# Patient Record
Sex: MALE | Race: WHITE | NOT HISPANIC OR LATINO | URBAN - METROPOLITAN AREA
[De-identification: names, ages, dates, MRNs, and addresses within clinical notes are randomized per-mention and may not be internally consistent; named-entity substitution may affect disease eponyms.]

---

## 2018-11-06 ENCOUNTER — INPATIENT (INPATIENT)
Facility: HOSPITAL | Age: 83
LOS: 8 days | Discharge: NURSING, REHAB OR LONG-TERM CARE | End: 2018-11-15
Attending: FAMILY MEDICINE | Admitting: FAMILY MEDICINE
Payer: MEDICARE

## 2018-11-06 VITALS
DIASTOLIC BLOOD PRESSURE: 61 MMHG | HEIGHT: 70 IN | SYSTOLIC BLOOD PRESSURE: 101 MMHG | RESPIRATION RATE: 17 BRPM | WEIGHT: 179.9 LBS | TEMPERATURE: 98 F | OXYGEN SATURATION: 98 % | HEART RATE: 76 BPM

## 2018-11-06 LAB
ALBUMIN SERPL ELPH-MCNC: 3.2 G/DL — LOW (ref 3.3–5)
ALP SERPL-CCNC: 68 U/L — SIGNIFICANT CHANGE UP (ref 40–120)
ALT FLD-CCNC: 19 U/L — SIGNIFICANT CHANGE UP (ref 12–78)
ANION GAP SERPL CALC-SCNC: 4 MMOL/L — LOW (ref 5–17)
APTT BLD: 28.4 SEC — SIGNIFICANT CHANGE UP (ref 27.5–36.3)
AST SERPL-CCNC: 14 U/L — LOW (ref 15–37)
BASOPHILS # BLD AUTO: 0.03 K/UL — SIGNIFICANT CHANGE UP (ref 0–0.2)
BASOPHILS NFR BLD AUTO: 0.3 % — SIGNIFICANT CHANGE UP (ref 0–2)
BILIRUB SERPL-MCNC: 0.2 MG/DL — SIGNIFICANT CHANGE UP (ref 0.2–1.2)
BLD GP AB SCN SERPL QL: SIGNIFICANT CHANGE UP
BUN SERPL-MCNC: 40 MG/DL — HIGH (ref 7–23)
CALCIUM SERPL-MCNC: 8.5 MG/DL — SIGNIFICANT CHANGE UP (ref 8.5–10.1)
CHLORIDE SERPL-SCNC: 109 MMOL/L — HIGH (ref 96–108)
CO2 SERPL-SCNC: 28 MMOL/L — SIGNIFICANT CHANGE UP (ref 22–31)
CREAT SERPL-MCNC: 1.02 MG/DL — SIGNIFICANT CHANGE UP (ref 0.5–1.3)
EOSINOPHIL # BLD AUTO: 0.16 K/UL — SIGNIFICANT CHANGE UP (ref 0–0.5)
EOSINOPHIL NFR BLD AUTO: 1.8 % — SIGNIFICANT CHANGE UP (ref 0–6)
GLUCOSE SERPL-MCNC: 110 MG/DL — HIGH (ref 70–99)
HCT VFR BLD CALC: 38.1 % — LOW (ref 39–50)
HGB BLD-MCNC: 12.4 G/DL — LOW (ref 13–17)
IMM GRANULOCYTES NFR BLD AUTO: 0.5 % — SIGNIFICANT CHANGE UP (ref 0–1.5)
INR BLD: 0.9 RATIO — SIGNIFICANT CHANGE UP (ref 0.88–1.16)
LYMPHOCYTES # BLD AUTO: 1.12 K/UL — SIGNIFICANT CHANGE UP (ref 1–3.3)
LYMPHOCYTES # BLD AUTO: 12.9 % — LOW (ref 13–44)
MCHC RBC-ENTMCNC: 29.5 PG — SIGNIFICANT CHANGE UP (ref 27–34)
MCHC RBC-ENTMCNC: 32.5 GM/DL — SIGNIFICANT CHANGE UP (ref 32–36)
MCV RBC AUTO: 90.7 FL — SIGNIFICANT CHANGE UP (ref 80–100)
MONOCYTES # BLD AUTO: 0.52 K/UL — SIGNIFICANT CHANGE UP (ref 0–0.9)
MONOCYTES NFR BLD AUTO: 6 % — SIGNIFICANT CHANGE UP (ref 2–14)
NEUTROPHILS # BLD AUTO: 6.81 K/UL — SIGNIFICANT CHANGE UP (ref 1.8–7.4)
NEUTROPHILS NFR BLD AUTO: 78.5 % — HIGH (ref 43–77)
NRBC # BLD: 0 /100 WBCS — SIGNIFICANT CHANGE UP (ref 0–0)
PLATELET # BLD AUTO: 254 K/UL — SIGNIFICANT CHANGE UP (ref 150–400)
POTASSIUM SERPL-MCNC: 4.5 MMOL/L — SIGNIFICANT CHANGE UP (ref 3.5–5.3)
POTASSIUM SERPL-SCNC: 4.5 MMOL/L — SIGNIFICANT CHANGE UP (ref 3.5–5.3)
PROT SERPL-MCNC: 6.4 GM/DL — SIGNIFICANT CHANGE UP (ref 6–8.3)
PROTHROM AB SERPL-ACNC: 10 SEC — SIGNIFICANT CHANGE UP (ref 10–12.9)
RBC # BLD: 4.2 M/UL — SIGNIFICANT CHANGE UP (ref 4.2–5.8)
RBC # FLD: 14.4 % — SIGNIFICANT CHANGE UP (ref 10.3–14.5)
SODIUM SERPL-SCNC: 141 MMOL/L — SIGNIFICANT CHANGE UP (ref 135–145)
TROPONIN I SERPL-MCNC: 0.39 NG/ML — HIGH (ref 0.01–0.04)
TYPE + AB SCN PNL BLD: SIGNIFICANT CHANGE UP
WBC # BLD: 8.68 K/UL — SIGNIFICANT CHANGE UP (ref 3.8–10.5)
WBC # FLD AUTO: 8.68 K/UL — SIGNIFICANT CHANGE UP (ref 3.8–10.5)

## 2018-11-06 PROCEDURE — 72125 CT NECK SPINE W/O DYE: CPT | Mod: 26

## 2018-11-06 PROCEDURE — 93010 ELECTROCARDIOGRAM REPORT: CPT

## 2018-11-06 PROCEDURE — 71045 X-RAY EXAM CHEST 1 VIEW: CPT | Mod: 26

## 2018-11-06 PROCEDURE — 70450 CT HEAD/BRAIN W/O DYE: CPT | Mod: 26

## 2018-11-06 RX ORDER — TETANUS TOXOID, REDUCED DIPHTHERIA TOXOID AND ACELLULAR PERTUSSIS VACCINE, ADSORBED 5; 2.5; 8; 8; 2.5 [IU]/.5ML; [IU]/.5ML; UG/.5ML; UG/.5ML; UG/.5ML
0.5 SUSPENSION INTRAMUSCULAR ONCE
Qty: 0 | Refills: 0 | Status: COMPLETED | OUTPATIENT
Start: 2018-11-06 | End: 2018-11-06

## 2018-11-06 NOTE — ED PROVIDER NOTE - MEDICAL DECISION MAKING DETAILS
Pt with unwitnessed fall at assisted living with laceration to left hand. Plan: CT head, laceration repair, labs.

## 2018-11-06 NOTE — ED PROVIDER NOTE - OBJECTIVE STATEMENT
89 y/o male with a PMHx of HTN, BPH presents to the ED from [facility] s/p unwitnessed fall. Not on anticoagulants. Pt does not know why he is at the hospital. +laceration. Denies any pain. NKDA. 89 y/o male with a PMHx of HTN, BPH presents to the ED from [facility] s/p unwitnessed fall. Not on anticoagulants. Pt does not know why he is at the hospital. +laceration. Denies any pain. NKDA. Nonsmoker.

## 2018-11-06 NOTE — ED ADULT TRIAGE NOTE - CHIEF COMPLAINT QUOTE
patient presents in ED from [facility] assisted living s/p unwitnessed fall. Cervical Collar in place. laceration to left hand. unknown head injury. patient is not on blood thinners.

## 2018-11-06 NOTE — ED PROVIDER NOTE - ENMT, MLM
Airway patent, Nasal mucosa clear. Mouth with normal mucosa. Throat has no vesicles, no oropharyngeal exudates and uvula is midline. Airway patent, Nasal mucosa clear. Mouth with normal mucosa. Throat has no vesicles, no oropharyngeal exudates and uvula is midline. Pt wearing collar.

## 2018-11-06 NOTE — ED ADULT NURSE NOTE - OBJECTIVE STATEMENT
pt was found sitting on the bed with bloody hand as per EMS. Pt unable to recall details of fall, unknown if patient fell or not. Multiple skin tears to bl/le and LUE. Pt's nail to fourth finger on left hand is nearly removed, blood under middle left finger. Pt is very hard of hearing

## 2018-11-06 NOTE — ED ADULT NURSE NOTE - NSIMPLEMENTINTERV_GEN_ALL_ED
Implemented All Fall with Harm Risk Interventions:  [place] to call system. Call bell, personal items and telephone within reach. Instruct patient to call for assistance. Room bathroom lighting operational. Non-slip footwear when patient is off stretcher. Physically safe environment: no spills, clutter or unnecessary equipment. Stretcher in lowest position, wheels locked, appropriate side rails in place. Provide visual cue, wrist band, yellow gown, etc. Monitor gait and stability. Monitor for mental status changes and reorient to person, place, and time. Review medications for side effects contributing to fall risk. Reinforce activity limits and safety measures with patient and family. Provide visual clues: red socks.

## 2018-11-06 NOTE — ED PROVIDER NOTE - SKIN, MLM
Skin normal color for race, warm, dry and intact. No evidence of rash. 3cm laceration left fourth finger medially distal. Fourth finger partial nail avulsion of medial nail.

## 2018-11-07 DIAGNOSIS — R55 SYNCOPE AND COLLAPSE: ICD-10-CM

## 2018-11-07 DIAGNOSIS — I35.0 NONRHEUMATIC AORTIC (VALVE) STENOSIS: ICD-10-CM

## 2018-11-07 DIAGNOSIS — I10 ESSENTIAL (PRIMARY) HYPERTENSION: ICD-10-CM

## 2018-11-07 DIAGNOSIS — I21.4 NON-ST ELEVATION (NSTEMI) MYOCARDIAL INFARCTION: ICD-10-CM

## 2018-11-07 LAB
ANION GAP SERPL CALC-SCNC: 8 MMOL/L — SIGNIFICANT CHANGE UP (ref 5–17)
BUN SERPL-MCNC: 41 MG/DL — HIGH (ref 7–23)
CALCIUM SERPL-MCNC: 8.3 MG/DL — LOW (ref 8.5–10.1)
CHLORIDE SERPL-SCNC: 110 MMOL/L — HIGH (ref 96–108)
CK SERPL-CCNC: 72 U/L — SIGNIFICANT CHANGE UP (ref 26–308)
CK SERPL-CCNC: 73 U/L — SIGNIFICANT CHANGE UP (ref 26–308)
CO2 SERPL-SCNC: 23 MMOL/L — SIGNIFICANT CHANGE UP (ref 22–31)
CREAT SERPL-MCNC: 0.95 MG/DL — SIGNIFICANT CHANGE UP (ref 0.5–1.3)
GLUCOSE SERPL-MCNC: 127 MG/DL — HIGH (ref 70–99)
HCT VFR BLD CALC: 38 % — LOW (ref 39–50)
HGB BLD-MCNC: 12.6 G/DL — LOW (ref 13–17)
MAGNESIUM SERPL-MCNC: 2.1 MG/DL — SIGNIFICANT CHANGE UP (ref 1.6–2.6)
MCHC RBC-ENTMCNC: 29.8 PG — SIGNIFICANT CHANGE UP (ref 27–34)
MCHC RBC-ENTMCNC: 33.2 GM/DL — SIGNIFICANT CHANGE UP (ref 32–36)
MCV RBC AUTO: 89.8 FL — SIGNIFICANT CHANGE UP (ref 80–100)
NRBC # BLD: 0 /100 WBCS — SIGNIFICANT CHANGE UP (ref 0–0)
NT-PROBNP SERPL-SCNC: HIGH PG/ML (ref 0–450)
PLATELET # BLD AUTO: 252 K/UL — SIGNIFICANT CHANGE UP (ref 150–400)
POTASSIUM SERPL-MCNC: 4.2 MMOL/L — SIGNIFICANT CHANGE UP (ref 3.5–5.3)
POTASSIUM SERPL-SCNC: 4.2 MMOL/L — SIGNIFICANT CHANGE UP (ref 3.5–5.3)
RBC # BLD: 4.23 M/UL — SIGNIFICANT CHANGE UP (ref 4.2–5.8)
RBC # FLD: 14.5 % — SIGNIFICANT CHANGE UP (ref 10.3–14.5)
SODIUM SERPL-SCNC: 141 MMOL/L — SIGNIFICANT CHANGE UP (ref 135–145)
TROPONIN I SERPL-MCNC: 2.39 NG/ML — HIGH (ref 0.01–0.04)
TROPONIN I SERPL-MCNC: 2.81 NG/ML — HIGH (ref 0.01–0.04)
TROPONIN I SERPL-MCNC: 2.95 NG/ML — HIGH (ref 0.01–0.04)
WBC # BLD: 9.24 K/UL — SIGNIFICANT CHANGE UP (ref 3.8–10.5)
WBC # FLD AUTO: 9.24 K/UL — SIGNIFICANT CHANGE UP (ref 3.8–10.5)

## 2018-11-07 PROCEDURE — 99285 EMERGENCY DEPT VISIT HI MDM: CPT

## 2018-11-07 PROCEDURE — 93306 TTE W/DOPPLER COMPLETE: CPT | Mod: 26

## 2018-11-07 PROCEDURE — 93010 ELECTROCARDIOGRAM REPORT: CPT

## 2018-11-07 PROCEDURE — 99223 1ST HOSP IP/OBS HIGH 75: CPT

## 2018-11-07 RX ORDER — HEPARIN SODIUM 5000 [USP'U]/ML
5000 INJECTION INTRAVENOUS; SUBCUTANEOUS EVERY 8 HOURS
Qty: 0 | Refills: 0 | Status: DISCONTINUED | OUTPATIENT
Start: 2018-11-07 | End: 2018-11-08

## 2018-11-07 RX ORDER — FINASTERIDE 5 MG/1
1 TABLET, FILM COATED ORAL
Qty: 0 | Refills: 0 | COMMUNITY

## 2018-11-07 RX ORDER — ACETAMINOPHEN 500 MG
650 TABLET ORAL EVERY 6 HOURS
Qty: 0 | Refills: 0 | Status: DISCONTINUED | OUTPATIENT
Start: 2018-11-07 | End: 2018-11-15

## 2018-11-07 RX ORDER — LANOLIN ALCOHOL/MO/W.PET/CERES
5 CREAM (GRAM) TOPICAL AT BEDTIME
Qty: 0 | Refills: 0 | Status: DISCONTINUED | OUTPATIENT
Start: 2018-11-07 | End: 2018-11-15

## 2018-11-07 RX ORDER — METOPROLOL TARTRATE 50 MG
25 TABLET ORAL
Qty: 0 | Refills: 0 | Status: DISCONTINUED | OUTPATIENT
Start: 2018-11-07 | End: 2018-11-07

## 2018-11-07 RX ORDER — CLOPIDOGREL BISULFATE 75 MG/1
75 TABLET, FILM COATED ORAL DAILY
Qty: 0 | Refills: 0 | Status: DISCONTINUED | OUTPATIENT
Start: 2018-11-08 | End: 2018-11-09

## 2018-11-07 RX ORDER — METOPROLOL TARTRATE 50 MG
25 TABLET ORAL DAILY
Qty: 0 | Refills: 0 | Status: DISCONTINUED | OUTPATIENT
Start: 2018-11-08 | End: 2018-11-15

## 2018-11-07 RX ORDER — ACETAMINOPHEN 500 MG
2 TABLET ORAL
Qty: 0 | Refills: 0 | COMMUNITY

## 2018-11-07 RX ORDER — TRAZODONE HCL 50 MG
100 TABLET ORAL AT BEDTIME
Qty: 0 | Refills: 0 | Status: DISCONTINUED | OUTPATIENT
Start: 2018-11-07 | End: 2018-11-09

## 2018-11-07 RX ORDER — ASPIRIN/CALCIUM CARB/MAGNESIUM 324 MG
81 TABLET ORAL DAILY
Qty: 0 | Refills: 0 | Status: DISCONTINUED | OUTPATIENT
Start: 2018-11-08 | End: 2018-11-10

## 2018-11-07 RX ORDER — METOPROLOL TARTRATE 50 MG
25 TABLET ORAL DAILY
Qty: 0 | Refills: 0 | Status: DISCONTINUED | OUTPATIENT
Start: 2018-11-07 | End: 2018-11-07

## 2018-11-07 RX ORDER — NITROGLYCERIN 6.5 MG
0.4 CAPSULE, EXTENDED RELEASE ORAL
Qty: 0 | Refills: 0 | Status: DISCONTINUED | OUTPATIENT
Start: 2018-11-07 | End: 2018-11-15

## 2018-11-07 RX ORDER — ASPIRIN/CALCIUM CARB/MAGNESIUM 324 MG
325 TABLET ORAL ONCE
Qty: 0 | Refills: 0 | Status: COMPLETED | OUTPATIENT
Start: 2018-11-07 | End: 2018-11-07

## 2018-11-07 RX ORDER — LISINOPRIL 2.5 MG/1
1 TABLET ORAL
Qty: 0 | Refills: 0 | COMMUNITY

## 2018-11-07 RX ORDER — ATORVASTATIN CALCIUM 80 MG/1
40 TABLET, FILM COATED ORAL AT BEDTIME
Qty: 0 | Refills: 0 | Status: DISCONTINUED | OUTPATIENT
Start: 2018-11-07 | End: 2018-11-15

## 2018-11-07 RX ORDER — DONEPEZIL HYDROCHLORIDE 10 MG/1
5 TABLET, FILM COATED ORAL AT BEDTIME
Qty: 0 | Refills: 0 | Status: DISCONTINUED | OUTPATIENT
Start: 2018-11-07 | End: 2018-11-15

## 2018-11-07 RX ORDER — DONEPEZIL HYDROCHLORIDE 10 MG/1
1 TABLET, FILM COATED ORAL
Qty: 0 | Refills: 0 | COMMUNITY

## 2018-11-07 RX ORDER — CLOPIDOGREL BISULFATE 75 MG/1
300 TABLET, FILM COATED ORAL ONCE
Qty: 0 | Refills: 0 | Status: COMPLETED | OUTPATIENT
Start: 2018-11-07 | End: 2018-11-07

## 2018-11-07 RX ORDER — SODIUM CHLORIDE 9 MG/ML
1000 INJECTION INTRAMUSCULAR; INTRAVENOUS; SUBCUTANEOUS
Qty: 0 | Refills: 0 | Status: DISCONTINUED | OUTPATIENT
Start: 2018-11-07 | End: 2018-11-10

## 2018-11-07 RX ORDER — LISINOPRIL 2.5 MG/1
2.5 TABLET ORAL DAILY
Qty: 0 | Refills: 0 | Status: DISCONTINUED | OUTPATIENT
Start: 2018-11-07 | End: 2018-11-08

## 2018-11-07 RX ORDER — LANOLIN ALCOHOL/MO/W.PET/CERES
1 CREAM (GRAM) TOPICAL
Qty: 0 | Refills: 0 | COMMUNITY

## 2018-11-07 RX ORDER — FINASTERIDE 5 MG/1
5 TABLET, FILM COATED ORAL DAILY
Qty: 0 | Refills: 0 | Status: DISCONTINUED | OUTPATIENT
Start: 2018-11-07 | End: 2018-11-15

## 2018-11-07 RX ADMIN — HEPARIN SODIUM 5000 UNIT(S): 5000 INJECTION INTRAVENOUS; SUBCUTANEOUS at 22:06

## 2018-11-07 RX ADMIN — HEPARIN SODIUM 5000 UNIT(S): 5000 INJECTION INTRAVENOUS; SUBCUTANEOUS at 15:26

## 2018-11-07 RX ADMIN — ATORVASTATIN CALCIUM 40 MILLIGRAM(S): 80 TABLET, FILM COATED ORAL at 22:06

## 2018-11-07 RX ADMIN — Medication 325 MILLIGRAM(S): at 09:25

## 2018-11-07 RX ADMIN — Medication 5 MILLIGRAM(S): at 22:06

## 2018-11-07 RX ADMIN — TETANUS TOXOID, REDUCED DIPHTHERIA TOXOID AND ACELLULAR PERTUSSIS VACCINE, ADSORBED 0.5 MILLILITER(S): 5; 2.5; 8; 8; 2.5 SUSPENSION INTRAMUSCULAR at 00:38

## 2018-11-07 RX ADMIN — SODIUM CHLORIDE 50 MILLILITER(S): 9 INJECTION INTRAMUSCULAR; INTRAVENOUS; SUBCUTANEOUS at 15:24

## 2018-11-07 RX ADMIN — CLOPIDOGREL BISULFATE 300 MILLIGRAM(S): 75 TABLET, FILM COATED ORAL at 10:39

## 2018-11-07 RX ADMIN — DONEPEZIL HYDROCHLORIDE 5 MILLIGRAM(S): 10 TABLET, FILM COATED ORAL at 22:06

## 2018-11-07 RX ADMIN — LISINOPRIL 2.5 MILLIGRAM(S): 2.5 TABLET ORAL at 15:25

## 2018-11-07 RX ADMIN — Medication 100 MILLIGRAM(S): at 22:06

## 2018-11-07 RX ADMIN — FINASTERIDE 5 MILLIGRAM(S): 5 TABLET, FILM COATED ORAL at 15:25

## 2018-11-07 RX ADMIN — Medication 25 MILLIGRAM(S): at 15:25

## 2018-11-07 NOTE — H&P ADULT - NSHPPHYSICALEXAM_GEN_ALL_CORE
PHYSICAL EXAM:    GENERAL: elderly gentleman, NAD    HEENT: AT/NC, pupils equal and reactive, EOMI, no oropharyngeal lesions, erythema, exudates, oral thrush    NECK:   supple, no carotid bruits, no palpable lymph nodes, no thyromegaly    CV:  +S1, +S2, regular,  +murmur heard best at LLSB    RESP: diminised bibasilar breath sounds, otherwise good air entry    BREAST:  not examined    GI:  abdomen, softly firm, non-tender, non-distended, normal BS, no bruits, no palpable masses    RECTAL:  not examined    :  no suprapubic tenderness    MSK:  +kyphosis, normal muscle tone, no atrophy, no rigidity, no contractions    EXT: chronic vascular changes to BLE, no clubbing, no cyanosis, no edema, no calf pain, swelling or erythema    VASCULAR:  pulses equal and symmetric in the upper and lower extremities    NEURO:  AAOX2-3 with marked STM/LTM deficit, grossly hard of hearing (has no HA);  lifts and resist BUE/BLE strongly 5/5, tongue midline, no focal neurological deficits, follows all    commands ([place] makes it tedious to communicate with him), able to move extremities spontaneously    SKIN: R shin superficial skin tear, L knee with superficial skin tear, L 4th finger with bandage/splint

## 2018-11-07 NOTE — H&P ADULT - HISTORY OF PRESENT ILLNESS
· HPI Objective Statement: 89 y/o male with a PMHx of HTN, BPH presents to the ED from [facility] s/p unwitnessed fall. Not on anticoagulants. Pt does not know why he is at the hospital. +laceration. Denies any pain. NKDA. 89 y/o male with a PMHx of HTN, BPH presents to the ED from [facility] s/p unwitnessed fall. Not on anticoagulants. Pt does not know why he is at the hospital. +laceration.     In the ED CTH / CT spine negative for acute findings; degenerative changes of spine;  he received 9 sutures to LUE 4th digit; troponin 0.391-->2.810-->2.950; ECG with TWI V1-v3; CXR w/ subtle airspace opacity at the right lower lobe cannot be excluded. No definite pleural effusions. Minimal fibrotic change at the left costophrenic angle is unchanged. Cardiomediastinal silhouette is intact.    Subjective: Denies CP/palpitation/SOB/HA/dizziness/abd pain/n/v/d/f/c; 89 y/o male with a PMHx of HTN, BPH presents to the ED from [facility] s/p unwitnessed fall. Not on anticoagulants. Pt does not know why he is at the hospital. +laceration.     In the ED CTH / CT spine negative for acute findings; degenerative changes of spine;  he received 9 sutures to LUE 4th digit; troponin 0.391-->2.810-->2.950; ECG with TWI V1-v3; CXR w/ subtle airspace opacity at the right lower lobe cannot be excluded. No definite pleural effusions. Minimal fibrotic change at the left costophrenic angle is unchanged. Cardiomediastinal silhouette is intact.    Subjective: Denies CP/palpitation/SOB/HA/dizziness/abd pain/n/v/d/f/c; ECG NSR 70 with less pronounced TWI in v1-v4 than initial ECG yesterday on arrival to ED 89 y/o male with a PMHx of HTN, BPH presents to the ED from [facility] s/p unwitnessed fall. Not on anticoagulants. Pt does not know why he is at the hospital. +laceration on the left 4 th finger     In the ED CTH / CT spine negative for acute findings; degenerative changes of spine;  he received 9 sutures to LUE 4th digit; troponin 0.391-->2.810-->2.950; ECG with TWI V1-v3; CXR w/ subtle airspace opacity at the right lower lobe cannot be excluded. No definite pleural effusions. Minimal fibrotic change at the left costophrenic angle is unchanged. Cardiomediastinal silhouette is intact.    Subjective: Denies CP/palpitation/SOB/HA/dizziness/abd pain/n/v/d/f/c; ECG NSR 70 with less pronounced TWI in v1-v4 than initial ECG yesterday on arrival to ED

## 2018-11-07 NOTE — H&P ADULT - NSHPLABSRESULTS_GEN_ALL_CORE
< from: CT Cervical Spine No Cont (11.06.18 @ 22:49) >  < from: CT Head No Cont (11.06.18 @ 22:49) >    Head:  Parenchymal volume loss is noted with prominent ventricles andsulci.   Chronic microvascular ischemic changes are identified. No acute   territorial infarct is demonstrated.    There is no evidence of an acute hemorrhage or mass-effect in the   posterior fossa or in the supratentorial region.     Evaluation of the osseous structures with the appropriate window appears   unremarkable. Vascular calcifications are noted. Sequela of right lens   surgery is seen.    Cervical spine:  Bones: Straightening of the normal cervical lordosis is seen which is   likely due to muscle spasm versus patient positioning. Grade 1   anterolisthesis of C7 on T1 and T1 on T2 are noted. The cervical   vertebral body heights are maintained. No fracture is demonstrated. Disc   space narrowing and marginal osteophyte formation isseen throughout the   cervical and upper thoracic spine. Multilevel posterior disc osteophyte   complexes are seen without evidence of severe spinal canal stenosis.   Multilevel neural foraminal stenosis is noted.    Soft tissues: The prevertebral soft tissues are unremarkable. The thyroid   is unremarkable.    Lung apices: A right pleural effusion is noted.       IMPRESSION:   1. No acute territorial infarct, hemorrhage, mass effect or calvarial   fracture.  2. Multilevel degenerative changes ofthe cervical spine without evidence   of a fracture.      LABS                       12.4   8.68  )-----------( 254      ( 06 Nov 2018 21:30 )             38.1     11-06    141  |  109<H>  |  40<H>  ----------------------------<  110<H>  4.5   |  28  |  1.02    Ca    8.5      06 Nov 2018 21:30    TPro  6.4  /  Alb  3.2<L>  /  TBili  0.2  /  DBili  x   /  AST  14<L>  /  ALT  19  /  AlkPhos  68  11-06    CARDIAC MARKERS ( 07 Nov 2018 10:52 )  2.950 ng/mL / x     / 72 U/L / x     / x      CARDIAC MARKERS ( 07 Nov 2018 05:49 )  2.810 ng/mL / x     / x     / x     / x      CARDIAC MARKERS ( 06 Nov 2018 21:30 )  0.391 ng/mL / x     / x     / x     / x      LIVER FUNCTIONS - ( 06 Nov 2018 21:30 )  Alb: 3.2 g/dL / Pro: 6.4 gm/dL / ALK PHOS: 68 U/L / ALT: 19 U/L / AST: 14 U/L / GGT: x           PT/INR - ( 06 Nov 2018 21:30 )   PT: 10.0 sec;   INR: 0.90 ratio       PTT - ( 06 Nov 2018 21:30 )  PTT:28.4 sec

## 2018-11-07 NOTE — H&P ADULT - NSHPSOCIALHISTORY_GEN_ALL_CORE
[facility], needs minimal assist with dressing and bathing; never smoke/drink/use illicit drugs;   functional decline past 3-4 months per d/w HCP

## 2018-11-07 NOTE — H&P ADULT - NSHPOUTPATIENTPROVIDERS_GEN_ALL_CORE
PCP at [facility] , [facility] Lilian Leal [phone], [phone] PCP at [facility] PCP Mynor Nath at [facility] [phone]

## 2018-11-07 NOTE — PATIENT PROFILE ADULT - NSPROCHRONICPAIN_GEN_A_NUR
Patient poor historian, extremely hard of hearing, confused, and refusing to speak about medical history for admission profile.

## 2018-11-07 NOTE — H&P ADULT - ASSESSMENT
Fall w/ injury likely syncope vs ACS vs mechanical fall  - r/o CVA - CTH negative  - troponinemia  - ~deep TWI on initial ECG in v1-v3; decrease in depth noted with f/u am ECG  - chest pain free  - asa/plavix /BB/ statin ordered  - TTE assess valves/ventricles/wall motion   - admit to tele  - cardiac consult  - pain mgmt with tylenol  - s/p 9 sutures in ED  - routine wound care to superficial BLE abrasions    Essential HTN  - BP stable (101-120's systolic)  - VS per routine  - home med norvasc on hold given initiation of BB /metoprolol  - con't lisinopril 2.5mg daily    Dementia / Alzheimers / Anxiety / Insomnia  - assist with ADL's as needed  - con't home meds: aricept,/ buspar / rapaflo / trazodone / melatonin    BPH  - con't proscar  - monitor I & O    IMPROVE VTE Individual Risk Assessment    RISK                                                                Points    [  ] Previous VTE                                                  3    [  ] Thrombophilia                                               2    [  ] Lower limb paralysis                                      2        (unable to hold up >15 seconds)      [  ] Current Cancer                                              2         (within 6 months)    [  ] Immobilization > 24 hrs                                1    [  ] ICU/CCU stay > 24 hours                              1    [x  ] Age > 60                                                      1    IMPROVE VTE Score _________    DVT PPX  SQ heparin    Dispo  - goals of care d/w pt & HCP--> DNR  - SW/PT/case management consult for post discharge needs    Time spent 67 mins Fall w/ injury likely syncope vs ACS vs mechanical fall  Elevated troponins due to NSTEMI   - r/o CVA - CTH negative  - troponins elevated   - ~deep TWI on initial ECG in v1-v3; decrease in depth noted with f/u am ECG  - chest pain free  - asa/plavix /BB/ statin ordered  - TTE assess valves/ventricles/wall motion   - tele  - cardiac consult  - not candidate for interventions  - pain mgmt with tylenol  - s/p 9 sutures in ED  - routine wound care to superficial BLE abrasions    * AS   - 2 d echo pending    Essential HTN  - BP stable (101-120's systolic)  - VS per routine  - home med norvasc on hold given initiation of BB /metoprolol  - con't lisinopril 2.5mg daily    Dementia / Alzheimers / Anxiety / Insomnia  - assist with ADL's as needed  - con't home meds: aricept,/ buspar / rapaflo / trazodone / melatonin    BPH  - con't proscar  - monitor I & O    IMPROVE VTE Individual Risk Assessment    RISK                                                                Points    [  ] Previous VTE                                                  3    [  ] Thrombophilia                                               2    [  ] Lower limb paralysis                                      2        (unable to hold up >15 seconds)      [  ] Current Cancer                                              2         (within 6 months)    [  ] Immobilization > 24 hrs                                1    [  ] ICU/CCU stay > 24 hours                              1    [x  ] Age > 60                                                      1    IMPROVE VTE Score _________    DVT PPX  SQ heparin    Dispo  Advanced directives   - 15 minutes spend   - goals of care d/w pt & HCP--> DNR  - SW/PT/case management consult for post discharge needs    Time spent 67 mins  Patient seen and examined with NP Radha Middleton  .  I personally had a face-to-face encounter with the patient, examined the patient myself and reviewed the plan of care with the patient and NP.   I agree with the assessment and plan of NP as stated and discussed.

## 2018-11-07 NOTE — H&P ADULT - PMH
BPH (benign prostatic hyperplasia)    HTN (hypertension) BPH (benign prostatic hyperplasia)    Dementia    HTN (hypertension)

## 2018-11-07 NOTE — PHYSICAL THERAPY INITIAL EVALUATION ADULT - GENERAL OBSERVATIONS, REHAB EVAL
HM; pt rec'd in bed supine; HR 75; denied pain; bandages to L index finger / R lower leg / L 1st toe

## 2018-11-07 NOTE — CONSULT NOTE ADULT - PROBLEM SELECTOR RECOMMENDATION 9
Echo and ECG substantiates elevated troponin suggestion of injury. Pt is not a candidate for intervention based on age. Recommend Plavix and metoprolol 25 mg daily. Pt is asymptomatic. Palliative consult.

## 2018-11-07 NOTE — H&P ADULT - NSHPREVIEWOFSYSTEMS_GEN_ALL_CORE
REVIEW OF SYSTEMS: INFO ALSO OBTAINED FROM STAFF (LPN) AT [facility] AND PT's NIECE ELIZABETH (his HCP)    General: denies fever, chills; functional decline past 3-4 months per niece    Skin/Breast: no changes  	  Ophthalmologic: no vision changes  	  ENMT:  [place], has no hearing aid    Respiratory and Thorax: no cough  	  Cardiovascular: denies CP/palpitations    Gastrointestinal: normal BM, no abd pain (BM record not kept at [facility])    Genitourinary: no urinary changes    Musculoskeletal: no muscle tenderness, no joint swelling or tenderness    Neurological: mild dementia, slightly confuse at times, needs redirection (as per LPN at [facility]    Psychiatric: denies S/H ideation, no depression/anxiety	    Hematology/Lymphatics: normal, no LN palpable	    Endocrine: normal    Allergic/Immunologic:	      REVIEW OF SYSTEM: ROS comprehensively negative except as above

## 2018-11-07 NOTE — PHYSICAL THERAPY INITIAL EVALUATION ADULT - CRITERIA FOR SKILLED THERAPEUTIC INTERVENTIONS
will attempt completion of Eval @ later date @ RN request; further course of PT intervention pending

## 2018-11-08 LAB
ALBUMIN SERPL ELPH-MCNC: 2.7 G/DL — LOW (ref 3.3–5)
ALP SERPL-CCNC: 64 U/L — SIGNIFICANT CHANGE UP (ref 40–120)
ALT FLD-CCNC: 17 U/L — SIGNIFICANT CHANGE UP (ref 12–78)
ANION GAP SERPL CALC-SCNC: 7 MMOL/L — SIGNIFICANT CHANGE UP (ref 5–17)
AST SERPL-CCNC: 16 U/L — SIGNIFICANT CHANGE UP (ref 15–37)
BASOPHILS # BLD AUTO: 0.02 K/UL — SIGNIFICANT CHANGE UP (ref 0–0.2)
BASOPHILS NFR BLD AUTO: 0.2 % — SIGNIFICANT CHANGE UP (ref 0–2)
BILIRUB SERPL-MCNC: 0.6 MG/DL — SIGNIFICANT CHANGE UP (ref 0.2–1.2)
BUN SERPL-MCNC: 41 MG/DL — HIGH (ref 7–23)
CALCIUM SERPL-MCNC: 8 MG/DL — LOW (ref 8.5–10.1)
CHLORIDE SERPL-SCNC: 110 MMOL/L — HIGH (ref 96–108)
CO2 SERPL-SCNC: 25 MMOL/L — SIGNIFICANT CHANGE UP (ref 22–31)
CREAT SERPL-MCNC: 0.87 MG/DL — SIGNIFICANT CHANGE UP (ref 0.5–1.3)
EOSINOPHIL # BLD AUTO: 0.11 K/UL — SIGNIFICANT CHANGE UP (ref 0–0.5)
EOSINOPHIL NFR BLD AUTO: 1.1 % — SIGNIFICANT CHANGE UP (ref 0–6)
GLUCOSE SERPL-MCNC: 97 MG/DL — SIGNIFICANT CHANGE UP (ref 70–99)
HCT VFR BLD CALC: 35.5 % — LOW (ref 39–50)
HGB BLD-MCNC: 11.6 G/DL — LOW (ref 13–17)
IMM GRANULOCYTES NFR BLD AUTO: 0.5 % — SIGNIFICANT CHANGE UP (ref 0–1.5)
LYMPHOCYTES # BLD AUTO: 1.09 K/UL — SIGNIFICANT CHANGE UP (ref 1–3.3)
LYMPHOCYTES # BLD AUTO: 10.7 % — LOW (ref 13–44)
MCHC RBC-ENTMCNC: 29.1 PG — SIGNIFICANT CHANGE UP (ref 27–34)
MCHC RBC-ENTMCNC: 32.7 GM/DL — SIGNIFICANT CHANGE UP (ref 32–36)
MCV RBC AUTO: 89 FL — SIGNIFICANT CHANGE UP (ref 80–100)
MONOCYTES # BLD AUTO: 0.53 K/UL — SIGNIFICANT CHANGE UP (ref 0–0.9)
MONOCYTES NFR BLD AUTO: 5.2 % — SIGNIFICANT CHANGE UP (ref 2–14)
NEUTROPHILS # BLD AUTO: 8.38 K/UL — HIGH (ref 1.8–7.4)
NEUTROPHILS NFR BLD AUTO: 82.3 % — HIGH (ref 43–77)
NRBC # BLD: 0 /100 WBCS — SIGNIFICANT CHANGE UP (ref 0–0)
PLATELET # BLD AUTO: 243 K/UL — SIGNIFICANT CHANGE UP (ref 150–400)
POTASSIUM SERPL-MCNC: 4 MMOL/L — SIGNIFICANT CHANGE UP (ref 3.5–5.3)
POTASSIUM SERPL-SCNC: 4 MMOL/L — SIGNIFICANT CHANGE UP (ref 3.5–5.3)
PROT SERPL-MCNC: 5.7 GM/DL — LOW (ref 6–8.3)
RBC # BLD: 3.99 M/UL — LOW (ref 4.2–5.8)
RBC # FLD: 14.5 % — SIGNIFICANT CHANGE UP (ref 10.3–14.5)
SODIUM SERPL-SCNC: 142 MMOL/L — SIGNIFICANT CHANGE UP (ref 135–145)
WBC # BLD: 10.18 K/UL — SIGNIFICANT CHANGE UP (ref 3.8–10.5)
WBC # FLD AUTO: 10.18 K/UL — SIGNIFICANT CHANGE UP (ref 3.8–10.5)

## 2018-11-08 PROCEDURE — 99233 SBSQ HOSP IP/OBS HIGH 50: CPT

## 2018-11-08 RX ORDER — LISINOPRIL 2.5 MG/1
2.5 TABLET ORAL AT BEDTIME
Qty: 0 | Refills: 0 | Status: DISCONTINUED | OUTPATIENT
Start: 2018-11-08 | End: 2018-11-15

## 2018-11-08 RX ADMIN — LISINOPRIL 2.5 MILLIGRAM(S): 2.5 TABLET ORAL at 21:46

## 2018-11-08 RX ADMIN — DONEPEZIL HYDROCHLORIDE 5 MILLIGRAM(S): 10 TABLET, FILM COATED ORAL at 21:46

## 2018-11-08 RX ADMIN — Medication 25 MILLIGRAM(S): at 06:07

## 2018-11-08 RX ADMIN — HEPARIN SODIUM 5000 UNIT(S): 5000 INJECTION INTRAVENOUS; SUBCUTANEOUS at 06:07

## 2018-11-08 RX ADMIN — LISINOPRIL 2.5 MILLIGRAM(S): 2.5 TABLET ORAL at 06:07

## 2018-11-08 RX ADMIN — Medication 100 MILLIGRAM(S): at 21:46

## 2018-11-08 RX ADMIN — Medication 10 MILLIGRAM(S): at 06:07

## 2018-11-08 RX ADMIN — Medication 81 MILLIGRAM(S): at 12:31

## 2018-11-08 RX ADMIN — Medication 5 MILLIGRAM(S): at 21:46

## 2018-11-08 RX ADMIN — ATORVASTATIN CALCIUM 40 MILLIGRAM(S): 80 TABLET, FILM COATED ORAL at 21:46

## 2018-11-08 RX ADMIN — Medication 10 MILLIGRAM(S): at 18:04

## 2018-11-08 RX ADMIN — FINASTERIDE 5 MILLIGRAM(S): 5 TABLET, FILM COATED ORAL at 12:31

## 2018-11-08 RX ADMIN — CLOPIDOGREL BISULFATE 75 MILLIGRAM(S): 75 TABLET, FILM COATED ORAL at 12:31

## 2018-11-08 NOTE — PROGRESS NOTE ADULT - SUBJECTIVE AND OBJECTIVE BOX
REASON FOR VISIT: MI, Aortic Stenosis    HPI:  89 y/o man with a history of HTN, BPH admitted s/p unwitnessed fall. He was found to have NSTEMI, Aortic Stenosis, Cardiomyopathy.    11/8/18:  Comfortable; only complaining of tooth / mouth pain; denies: angina, dyspnea, orthopnea.    MEDICATIONS  (STANDING):  aspirin  chewable 81 milliGRAM(s) Oral daily  atorvastatin 40 milliGRAM(s) Oral at bedtime  busPIRone 10 milliGRAM(s) Oral two times a day  clopidogrel Tablet 75 milliGRAM(s) Oral daily  donepezil 5 milliGRAM(s) Oral at bedtime  finasteride 5 milliGRAM(s) Oral daily  heparin  Injectable 5000 Unit(s) SubCutaneous every 8 hours  lisinopril Oral Tab/Cap - Peds 2.5 milliGRAM(s) Oral daily  melatonin 5 milliGRAM(s) Oral at bedtime  metoprolol succinate ER 25 milliGRAM(s) Oral daily  sodium chloride 0.9%. 1000 milliLiter(s) (50 mL/Hr) IV Continuous <Continuous>  traZODone 100 milliGRAM(s) Oral at bedtime    Vital Signs Last 24 Hrs  T(C): 36.5 (08 Nov 2018 04:48), Max: 36.5 (08 Nov 2018 04:48)  T(F): 97.7 (08 Nov 2018 04:48), Max: 97.7 (08 Nov 2018 04:48)  HR: 72 (08 Nov 2018 04:48) (72 - 82)  BP: 106/60 (08 Nov 2018 04:48) (106/60 - 131/85)  RR: 17 (08 Nov 2018 04:48) (17 - 18)  SpO2: 98% (08 Nov 2018 04:48) (94% - 98%)    PHYSICAL EXAM:  Constitutional: NAD, awake and alert, appears stated age  Neck:  supple,  No JVD  Respiratory: Breath sounds are clear bilaterally  Cardiovascular: S1 and S2, regular, systolic murmur at base  Gastrointestinal: Bowel Sounds present, soft, nontender.   Skin: No rash.    LABS:   CARDIAC MARKERS ( 07 Nov 2018 17:48 ) 2.390 ng/mL / x     / 73 U/L / x     / x      CARDIAC MARKERS ( 07 Nov 2018 10:52 ) 2.950 ng/mL / x     / 72 U/L / x     / x      CARDIAC MARKERS ( 07 Nov 2018 05:49 ) 2.810 ng/mL / x     / x     / x     / x      CARDIAC MARKERS ( 06 Nov 2018 21:30 ) 0.391 ng/mL / x     / x     / x     / x                           11.6   10.18 )-----------( 243      ( 08 Nov 2018 06:27 )             35.5     142  |  110<H>  |  41<H>  ----------------------------<  97  4.0   |  25  |  0.87    12 Lead ECG (11.07.18 @ 07:23): Normal sinus rhythm.  Left axis deviation.  Anteroseptal infarct. ST & T changes consider anterior wall ischemia.    Transthoracic Echocardiogram (11.07.18 @ 11:26):   Endocardium is not always well visualized, however, overall left ventricular systolic function appears decreased. There is a large area of apical akinesis. Estimated left ventricular ejection fraction is 35-40 %.   The left atrium is mildly dilated.   Severe aortic stenosis.   Mild mitral regurgitation.   EA reversal of the mitral inflow consistent with reduced compliance of the left ventricle   Mild tricuspid valve regurgitation.   A small, non hemodynamically significant effusion is present.

## 2018-11-08 NOTE — PROGRESS NOTE ADULT - ASSESSMENT
· Assessment		  Fall w/ injury likely syncope vs ACS vs mechanical fall  Elevated troponins due to NSTEMI   - r/o CVA - CTH negative  - troponins elevated   - ~deep TWI on initial ECG in v1-v3; decrease in depth noted with f/u am ECG  - chest pain free  - asa/plavix /BB/ statin ordered  - 2 d echo - severe AS , EF 35%  - tele  - cardiac consult  - not candidate for interventions  - pain mgmt with tylenol  - s/p 9 sutures in ED  - routine wound care to superficial BLE abrasions    * AS   - 2 d echo   - cardiology consult    Essential HTN  - BP stable (101-120's systolic)  - VS per routine  - home med norvasc on hold given initiation of BB /metoprolol  - con't lisinopril 2.5mg daily    Hematuria   - stop heparin sq, monitor  - us kidney  - monitor    Dementia / Alzheimers / Anxiety / Insomnia  - assist with ADL's as needed  - con't home meds: aricept,/ buspar / rapaflo / trazodone / melatonin    BPH  - con't proscar  - monitor I & O    Advanced directives   - 15 minutes spend   - goals of care d/w pt & HCP--> DNR  HCP: KIRIT Leal [phone], [phone]	  PCP Mynor Nath at [facility] [phone]	    - /PT/case management consult for post discharge needs

## 2018-11-08 NOTE — PROGRESS NOTE ADULT - SUBJECTIVE AND OBJECTIVE BOX
Subjective:  Patient is a 89y old  Male who presents with a chief complaint of fall, lacerations      HPI:  89 y/o male with a PMHx of HTN, BPH presents to the ED from [facility] s/p unwitnessed fall. Not on anticoagulants. Pt does not know why he is at the hospital. +laceration on the left 4 th finger   In the ED CTH / CT spine negative for acute findings; degenerative changes of spine;  he received 9 sutures to LUE 4th digit; troponin 0.391-->2.810-->2.950; ECG with TWI V1-v3; CXR w/ subtle airspace opacity at the right lower lobe cannot be excluded. No definite pleural effusions. Minimal fibrotic change at the left costophrenic angle is unchanged. Cardiomediastinal silhouette is intact.  Subjective: Denies CP/palpitation/SOB/HA/dizziness/abd pain/n/v/d/f/c; ECG NSR 70 with less pronounced TWI in v1-v4 than initial ECG yesterday on arrival to ED     11/8/18 - Patient seen and examined at bedside earlier today, denies cp, dyspnea, abdominal pain, afebrile     Review of system- Rest of the review of system are negative except mentioned in HPI    OBJECTIVE:   T(C): 36.7 (11-08-18 @ 11:20), Max: 36.7 (11-08-18 @ 11:20)  HR: 73 (11-08-18 @ 11:20) (72 - 73)  BP: 108/66 (11-08-18 @ 11:20) (106/60 - 108/66)  RR: 17 (11-08-18 @ 11:20) (17 - 17)  SpO2: 90% (11-08-18 @ 11:20) (90% - 98%)  Wt(kg): --  Daily     Daily     PHYSICAL EXAM:  GENERAL: NAD  NERVOUS SYSTEM:  Alert & Oriented X2, non- focal exam,  HEAD:  Atraumatic, Normocephalic  EYES: EOMI, PERRLA, conjunctiva and sclera clear  HEENT: Moist mucous membranes  NECK: Supple, No JVD  CHEST/LUNG: Clear to auscultation bilaterally; No rales, no rhonchi, no wheezing, or rubs  HEART: Regular rate and rhythm; No murmurs, rubs, or gallops  ABDOMEN: Soft, Nontender, Nondistended; Bowel sounds present  GENITOURINARY- Voiding, no suprapubic tenderness  EXTREMITIES:  2+ Peripheral Pulses, No clubbing, cyanosis, or edema  MUSCULOSKELETAL:- No muscle tenderness, Muscle tone normal, No joint tenderness, no Joint swelling, Joint range of motion-normal  SKIN-no rash, no lesion    LABS:                        11.6   10.18 )-----------( 243      ( 08 Nov 2018 06:27 )             35.5     11-08    142  |  110<H>  |  41<H>  ----------------------------<  97  4.0   |  25  |  0.87    Ca    8.0<L>      08 Nov 2018 06:27  Mg     2.1     11-07    TPro  5.7<L>  /  Alb  2.7<L>  /  TBili  0.6  /  DBili  x   /  AST  16  /  ALT  17  /  AlkPhos  64  11-08    PT/INR - ( 06 Nov 2018 21:30 )   PT: 10.0 sec;   INR: 0.90 ratio         PTT - ( 06 Nov 2018 21:30 )  PTT:28.4 sec  CARDIAC MARKERS ( 07 Nov 2018 17:48 )  2.390 ng/mL / x     / 73 U/L / x     / x      CARDIAC MARKERS ( 07 Nov 2018 10:52 )  2.950 ng/mL / x     / 72 U/L / x     / x      CARDIAC MARKERS ( 07 Nov 2018 05:49 )  2.810 ng/mL / x     / x     / x     / x      CARDIAC MARKERS ( 06 Nov 2018 21:30 )  0.391 ng/mL / x     / x     / x     / x              CAPILLARY BLOOD GLUCOSE            RECENT CULTURES:    RADIOLOGY & ADDITIONAL TESTS:    < from: CT Cervical Spine No Cont (11.06.18 @ 22:49) >  Head:  Parenchymal volume loss is noted with prominent ventricles andsulci.   Chronic microvascular ischemic changes are identified. No acute   territorial infarct is demonstrated.    There is no evidence of an acute hemorrhage or mass-effect in the   posterior fossa or in the supratentorial region.     Evaluation of the osseous structures with the appropriate window appears   unremarkable. Vascular calcifications are noted. Sequela of right lens   surgery is seen.    Cervical spine:  Bones: Straightening of the normal cervical lordosis is seen which is   likely due to muscle spasm versus patient positioning. Grade 1   anterolisthesis of C7 on T1 and T1 on T2 are noted. The cervical   vertebral body heights are maintained. No fracture is demonstrated. Disc   space narrowing and marginal osteophyte formation isseen throughout the   cervical and upper thoracic spine. Multilevel posterior disc osteophyte   complexes are seen without evidence of severe spinal canal stenosis.   Multilevel neural foraminal stenosis is noted.    Soft tissues: The prevertebral soft tissues are unremarkable. The thyroid   is unremarkable.    Lung apices: A right pleural effusion is noted.       IMPRESSION:   1. No acute territorial infarct, hemorrhage, mass effect or calvarial   fracture.  2. Multilevel degenerative changes ofthe cervical spine without evidence   of a fracture.      < end of copied text >    < from: Xray Chest 1 View- PORTABLE-Urgent (11.06.18 @ 23:01) >    Subtle airspace opacity at the right lower lobe cannot be excluded. No   definite pleural effusions. Minimal fibrotic change at the left   costophrenic angle is unchanged  .      Cardiomediastinal silhouette is intact.    < end of copied text >    Current medications:  acetaminophen   Tablet .. 650 milliGRAM(s) Oral every 6 hours PRN  aspirin  chewable 81 milliGRAM(s) Oral daily  atorvastatin 40 milliGRAM(s) Oral at bedtime  busPIRone 10 milliGRAM(s) Oral two times a day  clopidogrel Tablet 75 milliGRAM(s) Oral daily  donepezil 5 milliGRAM(s) Oral at bedtime  finasteride 5 milliGRAM(s) Oral daily  lisinopril Oral Tab/Cap - Peds 2.5 milliGRAM(s) Oral daily  melatonin 5 milliGRAM(s) Oral at bedtime  metoprolol succinate ER 25 milliGRAM(s) Oral daily  nitroglycerin     SubLingual 0.4 milliGRAM(s) SubLingual every 5 minutes PRN  sodium chloride 0.9%. 1000 milliLiter(s) IV Continuous <Continuous>  traZODone 100 milliGRAM(s) Oral at bedtime

## 2018-11-09 DIAGNOSIS — R33.9 RETENTION OF URINE, UNSPECIFIED: ICD-10-CM

## 2018-11-09 LAB
ANION GAP SERPL CALC-SCNC: 7 MMOL/L — SIGNIFICANT CHANGE UP (ref 5–17)
APPEARANCE UR: ABNORMAL
BACTERIA # UR AUTO: ABNORMAL
BILIRUB UR-MCNC: NEGATIVE — SIGNIFICANT CHANGE UP
BUN SERPL-MCNC: 45 MG/DL — HIGH (ref 7–23)
CALCIUM SERPL-MCNC: 8.6 MG/DL — SIGNIFICANT CHANGE UP (ref 8.5–10.1)
CHLORIDE SERPL-SCNC: 109 MMOL/L — HIGH (ref 96–108)
CO2 SERPL-SCNC: 26 MMOL/L — SIGNIFICANT CHANGE UP (ref 22–31)
COLOR SPEC: ABNORMAL
CREAT SERPL-MCNC: 0.92 MG/DL — SIGNIFICANT CHANGE UP (ref 0.5–1.3)
DIFF PNL FLD: ABNORMAL
EPI CELLS # UR: SIGNIFICANT CHANGE UP
GLUCOSE SERPL-MCNC: 110 MG/DL — HIGH (ref 70–99)
GLUCOSE UR QL: NEGATIVE MG/DL — SIGNIFICANT CHANGE UP
HCT VFR BLD CALC: 36.5 % — LOW (ref 39–50)
HGB BLD-MCNC: 11.8 G/DL — LOW (ref 13–17)
KETONES UR-MCNC: ABNORMAL
LEUKOCYTE ESTERASE UR-ACNC: ABNORMAL
MAGNESIUM SERPL-MCNC: 2.1 MG/DL — SIGNIFICANT CHANGE UP (ref 1.6–2.6)
MCHC RBC-ENTMCNC: 28.9 PG — SIGNIFICANT CHANGE UP (ref 27–34)
MCHC RBC-ENTMCNC: 32.3 GM/DL — SIGNIFICANT CHANGE UP (ref 32–36)
MCV RBC AUTO: 89.2 FL — SIGNIFICANT CHANGE UP (ref 80–100)
NITRITE UR-MCNC: NEGATIVE — SIGNIFICANT CHANGE UP
NRBC # BLD: 0 /100 WBCS — SIGNIFICANT CHANGE UP (ref 0–0)
PH UR: 5 — SIGNIFICANT CHANGE UP (ref 5–8)
PLATELET # BLD AUTO: 245 K/UL — SIGNIFICANT CHANGE UP (ref 150–400)
POTASSIUM SERPL-MCNC: 4.1 MMOL/L — SIGNIFICANT CHANGE UP (ref 3.5–5.3)
POTASSIUM SERPL-SCNC: 4.1 MMOL/L — SIGNIFICANT CHANGE UP (ref 3.5–5.3)
PROT UR-MCNC: 100 MG/DL
RBC # BLD: 4.09 M/UL — LOW (ref 4.2–5.8)
RBC # FLD: 14.6 % — HIGH (ref 10.3–14.5)
RBC CASTS # UR COMP ASSIST: ABNORMAL /HPF (ref 0–4)
SODIUM SERPL-SCNC: 142 MMOL/L — SIGNIFICANT CHANGE UP (ref 135–145)
SP GR SPEC: 1.01 — SIGNIFICANT CHANGE UP (ref 1.01–1.02)
TROPONIN I SERPL-MCNC: 0.79 NG/ML — HIGH (ref 0.01–0.04)
UROBILINOGEN FLD QL: NEGATIVE MG/DL — SIGNIFICANT CHANGE UP
WBC # BLD: 7.89 K/UL — SIGNIFICANT CHANGE UP (ref 3.8–10.5)
WBC # FLD AUTO: 7.89 K/UL — SIGNIFICANT CHANGE UP (ref 3.8–10.5)
WBC UR QL: ABNORMAL

## 2018-11-09 PROCEDURE — 93010 ELECTROCARDIOGRAM REPORT: CPT

## 2018-11-09 PROCEDURE — 99233 SBSQ HOSP IP/OBS HIGH 50: CPT

## 2018-11-09 PROCEDURE — 51102 DRAIN BL W/CATH INSERTION: CPT

## 2018-11-09 PROCEDURE — 76942 ECHO GUIDE FOR BIOPSY: CPT | Mod: 26

## 2018-11-09 PROCEDURE — 76770 US EXAM ABDO BACK WALL COMP: CPT | Mod: 26

## 2018-11-09 RX ORDER — TAMSULOSIN HYDROCHLORIDE 0.4 MG/1
0.4 CAPSULE ORAL AT BEDTIME
Qty: 0 | Refills: 0 | Status: DISCONTINUED | OUTPATIENT
Start: 2018-11-09 | End: 2018-11-15

## 2018-11-09 RX ORDER — GLYCERIN 1 %
1 DROPS OPHTHALMIC (EYE)
Qty: 0 | Refills: 0 | Status: DISCONTINUED | OUTPATIENT
Start: 2018-11-09 | End: 2018-11-15

## 2018-11-09 RX ADMIN — FINASTERIDE 5 MILLIGRAM(S): 5 TABLET, FILM COATED ORAL at 11:36

## 2018-11-09 RX ADMIN — Medication 1 DROP(S): at 00:48

## 2018-11-09 RX ADMIN — Medication 81 MILLIGRAM(S): at 11:36

## 2018-11-09 RX ADMIN — DONEPEZIL HYDROCHLORIDE 5 MILLIGRAM(S): 10 TABLET, FILM COATED ORAL at 22:46

## 2018-11-09 RX ADMIN — CLOPIDOGREL BISULFATE 75 MILLIGRAM(S): 75 TABLET, FILM COATED ORAL at 11:36

## 2018-11-09 RX ADMIN — TAMSULOSIN HYDROCHLORIDE 0.4 MILLIGRAM(S): 0.4 CAPSULE ORAL at 22:46

## 2018-11-09 RX ADMIN — Medication 10 MILLIGRAM(S): at 05:57

## 2018-11-09 RX ADMIN — Medication 25 MILLIGRAM(S): at 05:57

## 2018-11-09 RX ADMIN — Medication 5 MILLIGRAM(S): at 22:46

## 2018-11-09 RX ADMIN — ATORVASTATIN CALCIUM 40 MILLIGRAM(S): 80 TABLET, FILM COATED ORAL at 22:46

## 2018-11-09 NOTE — PROGRESS NOTE ADULT - ASSESSMENT
· Assessment		  Fall w/ injury likely syncope vs ACS vs mechanical fall  Elevated troponins due to NSTEMI   - r/o CVA - CTH negative  - troponins elevated   - ~deep TWI on initial ECG in v1-v3; decrease in depth noted with f/u am ECG  - chest pain free  - asa//BB/ statin ordered  - plavix stopped due to ongoing hematuria  - 2 d echo - severe AS , EF 35%  - tele  - cardiac consult  - not candidate for interventions  - pain mgmt with tylenol  - s/p 9 sutures in ED  - routine wound care to superficial BLE abrasions    * AS   - 2 d echo   - cardiology consult    Essential HTN  - BP stable (101-120's systolic)  - VS per routine  - home med norvasc on hold given initiation of BB /metoprolol  - con't lisinopril 2.5mg daily    Hematuria   Urinary retention with moderate bilateral hydronephrosis  - stop heparin sq, monitor  - stop plavix   - c/w finasteride, add flomax  - us kidney - noted  - urology consult - unable to insert Verduzco, will need IR placement of suprapubic catheter  - d/w Dr. Bangura cardiologist - ok to stop plavix, patient high risk but there is no absolute contraindication for emergent procedure  - Revised cardiac risk - moderate 6.6 % of MI, cardiac arrest       Dementia / Alzheimers / Anxiety / Insomnia  - assist with ADL's as needed  - con't home meds: aricept,/ buspar / rapaflo / trazodone / melatonin    BPH  - con't proscar  - monitor I & O    Advanced directives   - 15 minutes spend   - goals of care d/w pt & HCP--> DNR  HCP: KIRIT Leal [phone], [phone]	  PCP Mynor Nath at [facility] [phone]	    - SW/PT/case management consult for post discharge needs

## 2018-11-09 NOTE — PROGRESS NOTE ADULT - SUBJECTIVE AND OBJECTIVE BOX
CHIEF COMPLAINT:  Urinary retention    HISTORY OF PRESENT ILLNESS:  89 y/o male with a PMHx of HTN, BPH presents to the ED from [facility] s/p unwitnessed fall. Not on anticoagulants. Pt does not know why he is at the hospital. +laceration on the left 4 th finger     In the ED CTH / CT spine negative for acute findings; degenerative changes of spine;  he received 9 sutures to LUE 4th digit; troponin 0.391-->2.810-->2.950; ECG with TWI V1-v3; CXR w/ subtle airspace opacity at the right lower lobe cannot be excluded. No definite pleural effusions. Minimal fibrotic change at the left costophrenic angle is unchanged. Cardiomediastinal silhouette is intact.    18:  called for urinary retention with distended bladder and bilateral hydro. Attempts by staff and me to insert michael are unsuccessful. Pt has been treated with incontinence with diaper over the past 2 mos and is a resident of assisted living. History from [facility] [phone]. Pt takes plavix. Enzymes consistent with mi and pt seen by cardiology.  PAST MEDICAL & SURGICAL HISTORY:  Dementia  BPH (benign prostatic hyperplasia)  HTN (hypertension)  No significant past surgical history      REVIEW OF SYSTEMS:Same previous  MEDICATIONS  (STANDING):  aspirin  chewable 81 milliGRAM(s) Oral daily  atorvastatin 40 milliGRAM(s) Oral at bedtime  busPIRone 10 milliGRAM(s) Oral two times a day  donepezil 5 milliGRAM(s) Oral at bedtime  finasteride 5 milliGRAM(s) Oral daily  lisinopril 2.5 milliGRAM(s) Oral at bedtime  melatonin 5 milliGRAM(s) Oral at bedtime  metoprolol succinate ER 25 milliGRAM(s) Oral daily  sodium chloride 0.9%. 1000 milliLiter(s) (50 mL/Hr) IV Continuous <Continuous>  tamsulosin 0.4 milliGRAM(s) Oral at bedtime  traZODone 100 milliGRAM(s) Oral at bedtime    MEDICATIONS  (PRN):  acetaminophen   Tablet .. 650 milliGRAM(s) Oral every 6 hours PRN Temp greater or equal to 38C (100.4F), Mild Pain (1 - 3)  naphazoline/pheniramine Solution 1 Drop(s) Both EYES four times a day PRN Dry and itchy eyes  nitroglycerin     SubLingual 0.4 milliGRAM(s) SubLingual every 5 minutes PRN Chest Pain      PE:Same Previous    Allergies    No Known Allergies    Intolerances        SOCIAL HISTORY:Same previous    FAMILY HISTORY:  Family history of cancer (Mother)      Vital Signs Last 24 Hrs  T(C): 36.3 (2018 10:32), Max: 36.5 (2018 05:27)  T(F): 97.4 (2018 10:32), Max: 97.7 (2018 05:27)  HR: 79 (2018 10:32) (72 - 79)  BP: 120/77 (2018 10:32) (120/77 - 126/70)  BP(mean): --  RR: 17 (2018 10:32) (17 - 18)  SpO2: 95% (2018 10:32) (95% - 95%)    PHYSICAL EXAM:Same previous    LABS:                        11.8   7.89  )-----------( 245      ( 2018 06:07 )             36.5     11    142  |  109<H>  |  45<H>  ----------------------------<  110<H>  4.1   |  26  |  0.92    Ca    8.6      2018 06:07  Mg     2.1         TPro  5.7<L>  /  Alb  2.7<L>  /  TBili  0.6  /  DBili  x   /  AST  16  /  ALT  17  /  AlkPhos  64  11-08      Urinalysis Basic - ( 2018 13:10 )    Color: Brown / Appearance: very cloudy / S.015 / pH: x  Gluc: x / Ketone: Trace  / Bili: Negative / Urobili: Negative mg/dL   Blood: x / Protein: 100 mg/dL / Nitrite: Negative   Leuk Esterase: Moderate / RBC: 25-50 /HPF / WBC 26-50   Sq Epi: x / Non Sq Epi: Few / Bacteria: Many      Urine Culture:     RADIOLOGY & ADDITIONAL STUDIES:

## 2018-11-09 NOTE — PROGRESS NOTE ADULT - SUBJECTIVE AND OBJECTIVE BOX
Subjective:  Patient is a 89y old  Male who presents with a chief complaint of fall, lacerations      HPI:  89 y/o male with a PMHx of HTN, BPH presents to the ED from [facility] s/p unwitnessed fall. Not on anticoagulants. Pt does not know why he is at the hospital. +laceration on the left 4 th finger   In the ED CTH / CT spine negative for acute findings; degenerative changes of spine;  he received 9 sutures to LUE 4th digit; troponin 0.391-->2.810-->2.950; ECG with TWI V1-v3; CXR w/ subtle airspace opacity at the right lower lobe cannot be excluded. No definite pleural effusions. Minimal fibrotic change at the left costophrenic angle is unchanged. Cardiomediastinal silhouette is intact.  Subjective: Denies CP/palpitation/SOB/HA/dizziness/abd pain/n/v/d/f/c; ECG NSR 70 with less pronounced TWI in v1-v4 than initial ECG yesterday on arrival to ED     18 - Patient seen and examined at bedside earlier today, denies cp, dyspnea, abdominal pain, afebrile   18 - pt seen and examined , denies CP, palpitations, abdominal pain, + hematuria, POC discussed at bedside    Review of system- Rest of the review of system are negative except mentioned in HPI    T(C): 36.3 (18 @ 10:32), Max: 36.5 (18 @ 05:27)  T(F): 97.4 (18 @ 10:32), Max: 97.7 (18 @ 05:27)  HR: 79 (18 @ 10:32) (72 - 79)  BP: 120/77 (18 @ 10:32) (120/77 - 126/70)  RR: 17 (18 @ 10:32) (17 - 18)  SpO2: 95% (18 @ 10:32) (95% - 95%)  Wt(kg): --    PHYSICAL EXAM:  GENERAL: NAD  NERVOUS SYSTEM:  Alert & Oriented X2, non- focal exam,  HEAD:  Atraumatic, Normocephalic  EYES: EOMI, PERRLA, conjunctiva and sclera clear  HEENT: Moist mucous membranes  NECK: Supple, No JVD  CHEST/LUNG: Clear to auscultation bilaterally; No rales, no rhonchi, no wheezing, or rubs  HEART: Regular rate and rhythm; No murmurs, rubs, or gallops  ABDOMEN: Soft, Nontender, Nondistended; Bowel sounds present  GENITOURINARY- Voiding, no suprapubic tenderness  EXTREMITIES:  2+ Peripheral Pulses, No clubbing, cyanosis, or edema  MUSCULOSKELETAL:- No muscle tenderness, Muscle tone normal, No joint tenderness, no Joint swelling, Joint range of motion-normal  SKIN-no rash, no lesion    LABS:      142  |  109<H>  |  45<H>  ----------------------------<  110<H>  4.1   |  26  |  0.92    Ca    8.6      2018 06:07  Mg     2.1         TPro  5.7<L>  /  Alb  2.7<L>  /  TBili  0.6  /  DBili  x   /  AST  16  /  ALT  17  /  AlkPhos  64                          11.8   7.89  )-----------( 245      ( 2018 06:07 )             36.5       CARDIAC MARKERS ( 2018 06:07 )  0.787 ng/mL / x     / x     / x     / x      CARDIAC MARKERS ( 2018 17:48 )  2.390 ng/mL / x     / 73 U/L / x     / x        LIVER FUNCTIONS - ( 2018 06:27 )  Alb: 2.7 g/dL / Pro: 5.7 gm/dL / ALK PHOS: 64 U/L / ALT: 17 U/L / AST: 16 U/L / GGT: x                 Urinalysis Basic - ( 2018 13:10 )    Color: Brown / Appearance: very cloudy / S.015 / pH: x  Gluc: x / Ketone: Trace  / Bili: Negative / Urobili: Negative mg/dL   Blood: x / Protein: 100 mg/dL / Nitrite: Negative   Leuk Esterase: Moderate / RBC: 25-50 /HPF / WBC 26-50   Sq Epi: x / Non Sq Epi: Few / Bacteria: Many                            11.6   10.18 )-----------( 243      ( 2018 06:27 )             35.5     11-    142  |  110<H>  |  41<H>  ----------------------------<  97  4.0   |  25  |  0.87    Ca    8.0<L>      2018 06:27  Mg     2.1     07    TPro  5.7<L>  /  Alb  2.7<L>  /  TBili  0.6  /  DBili  x   /  AST  16  /  ALT  17  /  AlkPhos  64  11-08    PT/INR - ( 2018 21:30 )   PT: 10.0 sec;   INR: 0.90 ratio         PTT - ( 2018 21:30 )  PTT:28.4 sec  CARDIAC MARKERS ( 2018 17:48 )  2.390 ng/mL / x     / 73 U/L / x     / x      CARDIAC MARKERS ( 2018 10:52 )  2.950 ng/mL / x     / 72 U/L / x     / x      CARDIAC MARKERS ( 2018 05:49 )  2.810 ng/mL / x     / x     / x     / x      CARDIAC MARKERS ( 2018 21:30 )  0.391 ng/mL / x     / x     / x     / x              CAPILLARY BLOOD GLUCOSE            RECENT CULTURES:    RADIOLOGY & ADDITIONAL TESTS:    < from: US Kidney and Bladder (18 @ 09:41) >  IMPRESSION:     Moderate bilateral hydroureteronephrosis and markedly distended urinary   bladder.      < end of copied text >    < from: CT Cervical Spine No Cont (18 @ 22:49) >  Head:  Parenchymal volume loss is noted with prominent ventricles andsulci.   Chronic microvascular ischemic changes are identified. No acute   territorial infarct is demonstrated.    There is no evidence of an acute hemorrhage or mass-effect in the   posterior fossa or in the supratentorial region.     Evaluation of the osseous structures with the appropriate window appears   unremarkable. Vascular calcifications are noted. Sequela of right lens   surgery is seen.    Cervical spine:  Bones: Straightening of the normal cervical lordosis is seen which is   likely due to muscle spasm versus patient positioning. Grade 1   anterolisthesis of C7 on T1 and T1 on T2 are noted. The cervical   vertebral body heights are maintained. No fracture is demonstrated. Disc   space narrowing and marginal osteophyte formation isseen throughout the   cervical and upper thoracic spine. Multilevel posterior disc osteophyte   complexes are seen without evidence of severe spinal canal stenosis.   Multilevel neural foraminal stenosis is noted.    Soft tissues: The prevertebral soft tissues are unremarkable. The thyroid   is unremarkable.    Lung apices: A right pleural effusion is noted.       IMPRESSION:   1. No acute territorial infarct, hemorrhage, mass effect or calvarial   fracture.  2. Multilevel degenerative changes ofthe cervical spine without evidence   of a fracture.      < end of copied text >    < from: Xray Chest 1 View- PORTABLE-Urgent (18 @ 23:01) >    Subtle airspace opacity at the right lower lobe cannot be excluded. No   definite pleural effusions. Minimal fibrotic change at the left   costophrenic angle is unchanged  .      Cardiomediastinal silhouette is intact.    < end of copied text >    MEDICATIONS  (STANDING):  aspirin  chewable 81 milliGRAM(s) Oral daily  atorvastatin 40 milliGRAM(s) Oral at bedtime  busPIRone 10 milliGRAM(s) Oral two times a day  donepezil 5 milliGRAM(s) Oral at bedtime  finasteride 5 milliGRAM(s) Oral daily  lisinopril 2.5 milliGRAM(s) Oral at bedtime  melatonin 5 milliGRAM(s) Oral at bedtime  metoprolol succinate ER 25 milliGRAM(s) Oral daily  sodium chloride 0.9%. 1000 milliLiter(s) (50 mL/Hr) IV Continuous <Continuous>  tamsulosin 0.4 milliGRAM(s) Oral at bedtime  traZODone 100 milliGRAM(s) Oral at bedtime    MEDICATIONS  (PRN):  acetaminophen   Tablet .. 650 milliGRAM(s) Oral every 6 hours PRN Temp greater or equal to 38C (100.4F), Mild Pain (1 - 3)  naphazoline/pheniramine Solution 1 Drop(s) Both EYES four times a day PRN Dry and itchy eyes  nitroglycerin     SubLingual 0.4 milliGRAM(s) SubLingual every 5 minutes PRN Chest Pain

## 2018-11-09 NOTE — CONSULT NOTE ADULT - SUBJECTIVE AND OBJECTIVE BOX
89 y/o male admitted with MI, found to have severe urinary retention/ bladder outlet obstruction and bilateral hydronephrosis.  Poor candidate for anesthesia/ OR. Consulted for suprapubic catheter placement.

## 2018-11-09 NOTE — CONSULT NOTE ADULT - ASSESSMENT
89 y/o male with bladder outlet obstruction and hydronephrosis admitted with MI.  Poor candidate for anesthesia.    Risks/ benefits of percutaneous suprapubic catheter placement discussed at length with the patient's health care proxy.  At this time she wishes to defer placement of the suprapubic catheter so that she speak to other family members.  She will let the primary team know if she wishes to proceed with the procedure.

## 2018-11-09 NOTE — PROGRESS NOTE ADULT - SUBJECTIVE AND OBJECTIVE BOX
PCP:    REQUESTING PHYSICIAN:    REASON FOR CONSULT:    CHIEF COMPLAINT:    HPI:  89 y/o man with a history of HTN, BPH admitted s/p unwitnessed fall. He was found to have NSTEMI, Aortic Stenosis, Cardiomyopathy.    11/8/18:  Comfortable; only complaining of tooth / mouth pain; denies: angina, dyspnea, orthopnea.  1/9/18: Comfortable. No arrhythmia     PAST MEDICAL & SURGICAL HISTORY:  Dementia  BPH (benign prostatic hyperplasia)  HTN (hypertension)  No significant past surgical history      SOCIAL HISTORY:    FAMILY HISTORY:  Family history of cancer (Mother)      ALLERGIES:  Allergies    No Known Allergies    Intolerances        MEDICATIONS:    MEDICATIONS  (STANDING):  aspirin  chewable 81 milliGRAM(s) Oral daily  atorvastatin 40 milliGRAM(s) Oral at bedtime  busPIRone 10 milliGRAM(s) Oral two times a day  clopidogrel Tablet 75 milliGRAM(s) Oral daily  donepezil 5 milliGRAM(s) Oral at bedtime  finasteride 5 milliGRAM(s) Oral daily  lisinopril 2.5 milliGRAM(s) Oral at bedtime  melatonin 5 milliGRAM(s) Oral at bedtime  metoprolol succinate ER 25 milliGRAM(s) Oral daily  sodium chloride 0.9%. 1000 milliLiter(s) (50 mL/Hr) IV Continuous <Continuous>  traZODone 100 milliGRAM(s) Oral at bedtime    MEDICATIONS  (PRN):  acetaminophen   Tablet .. 650 milliGRAM(s) Oral every 6 hours PRN Temp greater or equal to 38C (100.4F), Mild Pain (1 - 3)  naphazoline/pheniramine Solution 1 Drop(s) Both EYES four times a day PRN Dry and itchy eyes  nitroglycerin     SubLingual 0.4 milliGRAM(s) SubLingual every 5 minutes PRN Chest Pain        Vital Signs Last 24 Hrs  T(C): 36.3 (09 Nov 2018 10:32), Max: 36.7 (08 Nov 2018 11:20)  T(F): 97.4 (09 Nov 2018 10:32), Max: 98.1 (08 Nov 2018 11:20)  HR: 79 (09 Nov 2018 10:32) (72 - 79)  BP: 120/77 (09 Nov 2018 10:32) (108/66 - 126/70)  BP(mean): --  RR: 17 (09 Nov 2018 10:32) (17 - 18)  SpO2: 95% (09 Nov 2018 10:32) (90% - 95%)Daily     Daily I&O's Summary      PHYSICAL EXAM:    Constitutional: NAD, awake and alert, well-developed  HEENT: PERR, EOMI,  No oral cyananosis.  Neck:  supple,  No JVD  Respiratory: Breath sounds are clear bilaterally, No wheezing, rales or rhonchi  Cardiovascular: S1 and S2, regular rate and rhythm,1/6 EVAN  Gastrointestinal: Bowel Sounds present, soft, nontender.   Extremities: No peripheral edema. No clubbing or cyanosis.  Vascular: 2+ peripheral pulses  Neurological: A/O x 3, no focal deficits  Musculoskeletal: no calf tenderness.  Skin: No rashes.      LABS: All Labs Reviewed:                        11.8   7.89  )-----------( 245      ( 09 Nov 2018 06:07 )             36.5                         11.6   10.18 )-----------( 243      ( 08 Nov 2018 06:27 )             35.5                         12.6   9.24  )-----------( 252      ( 07 Nov 2018 16:20 )             38.0     09 Nov 2018 06:07    142    |  109    |  45     ----------------------------<  110    4.1     |  26     |  0.92   08 Nov 2018 06:27    142    |  110    |  41     ----------------------------<  97     4.0     |  25     |  0.87   07 Nov 2018 16:20    141    |  110    |  41     ----------------------------<  127    4.2     |  23     |  0.95     Ca    8.6        09 Nov 2018 06:07  Ca    8.0        08 Nov 2018 06:27  Ca    8.3        07 Nov 2018 16:20  Mg     2.1       09 Nov 2018 06:07  Mg     2.1       07 Nov 2018 16:20    TPro  5.7    /  Alb  2.7    /  TBili  0.6    /  DBili  x      /  AST  16     /  ALT  17     /  AlkPhos  64     08 Nov 2018 06:27  TPro  6.4    /  Alb  3.2    /  TBili  0.2    /  DBili  x      /  AST  14     /  ALT  19     /  AlkPhos  68     06 Nov 2018 21:30      CARDIAC MARKERS ( 09 Nov 2018 06:07 )  0.787 ng/mL / x     / x     / x     / x      CARDIAC MARKERS ( 07 Nov 2018 17:48 )  2.390 ng/mL / x     / 73 U/L / x     / x          Blood Culture:   11-07 @ 16:20  Pro Bnp [id number]        RADIOLOGY/EKG:< from: 12 Lead ECG (11.09.18 @ 07:21) >  Diagnosis Line Normal sinus rhythmwith sinus arrhythmia  Left axis deviation  Anterolateral infarct  Nonspecific ST abnormality  consider anterior wall ischemia  Abnormal ECG    Confirmed by EVELIA HOLT MD ([id number]) on 11/9/2018 8:24:06 AM    < end of copied text >    < from: Transthoracic Echocardiogram (11.07.18 @ 11:26) >  Impression     Summary     Endocardium is not always well visualized, however, overall left   ventricular systolic function appears decreased. There is a large area of   apical akinesis.   Estimated left ventricular ejection fraction is 35-40 %.   The left atrium is mildly dilated.   Normal appearing right atrium.   Normal appearing right ventricle structure and function.   Significant fibrocalcific changes noted to the aortic valve leaflets with   restriction in leaflet excursion. Peak transaortic gradient is 34 mmHg;   however, the calculated NOEMI is .94cm/2 this finding is consistent with   severe aortic stenosis.   Fibrocalcific changes noted to the mitral valve leaflets with preserved   leaflet excursion.   Mild mitral annular calcification is present.   Mild (1+) mitral regurgitation is present.   EA reversal of the mitral inflow consistent with reduced compliance of   the   left ventricle   Normal appearing tricuspid valve structure and function.   Mild (1+) tricuspid valve regurgitation is present.   A small, non hemodynamically significant effusion is present.     Signature     ----------------------------------------------------------------   Electronically signed by Israel Álvarez MD(Interpreting   physician) on 11/07/2018 04:26 PM   ----------------------------------------------------------------    < end of copied text >      ECHO/CARDIAC CATHTERIZATION/STRESS TEST:

## 2018-11-10 LAB
ANION GAP SERPL CALC-SCNC: 7 MMOL/L — SIGNIFICANT CHANGE UP (ref 5–17)
BUN SERPL-MCNC: 38 MG/DL — HIGH (ref 7–23)
CALCIUM SERPL-MCNC: 8.2 MG/DL — LOW (ref 8.5–10.1)
CHLORIDE SERPL-SCNC: 111 MMOL/L — HIGH (ref 96–108)
CO2 SERPL-SCNC: 24 MMOL/L — SIGNIFICANT CHANGE UP (ref 22–31)
CREAT SERPL-MCNC: 0.67 MG/DL — SIGNIFICANT CHANGE UP (ref 0.5–1.3)
GLUCOSE SERPL-MCNC: 99 MG/DL — SIGNIFICANT CHANGE UP (ref 70–99)
HCT VFR BLD CALC: 32.9 % — LOW (ref 39–50)
HCT VFR BLD CALC: 33.5 % — LOW (ref 39–50)
HGB BLD-MCNC: 10.9 G/DL — LOW (ref 13–17)
HGB BLD-MCNC: 11 G/DL — LOW (ref 13–17)
MCHC RBC-ENTMCNC: 29.4 PG — SIGNIFICANT CHANGE UP (ref 27–34)
MCHC RBC-ENTMCNC: 33.1 GM/DL — SIGNIFICANT CHANGE UP (ref 32–36)
MCV RBC AUTO: 88.7 FL — SIGNIFICANT CHANGE UP (ref 80–100)
NRBC # BLD: 0 /100 WBCS — SIGNIFICANT CHANGE UP (ref 0–0)
PLATELET # BLD AUTO: 222 K/UL — SIGNIFICANT CHANGE UP (ref 150–400)
POTASSIUM SERPL-MCNC: 3.9 MMOL/L — SIGNIFICANT CHANGE UP (ref 3.5–5.3)
POTASSIUM SERPL-SCNC: 3.9 MMOL/L — SIGNIFICANT CHANGE UP (ref 3.5–5.3)
RBC # BLD: 3.71 M/UL — LOW (ref 4.2–5.8)
RBC # FLD: 14.6 % — HIGH (ref 10.3–14.5)
SODIUM SERPL-SCNC: 142 MMOL/L — SIGNIFICANT CHANGE UP (ref 135–145)
WBC # BLD: 7.87 K/UL — SIGNIFICANT CHANGE UP (ref 3.8–10.5)
WBC # FLD AUTO: 7.87 K/UL — SIGNIFICANT CHANGE UP (ref 3.8–10.5)

## 2018-11-10 PROCEDURE — 99233 SBSQ HOSP IP/OBS HIGH 50: CPT

## 2018-11-10 PROCEDURE — 93010 ELECTROCARDIOGRAM REPORT: CPT

## 2018-11-10 RX ORDER — SODIUM CHLORIDE 9 MG/ML
1000 INJECTION INTRAMUSCULAR; INTRAVENOUS; SUBCUTANEOUS
Qty: 0 | Refills: 0 | Status: DISCONTINUED | OUTPATIENT
Start: 2018-11-10 | End: 2018-11-15

## 2018-11-10 RX ADMIN — DONEPEZIL HYDROCHLORIDE 5 MILLIGRAM(S): 10 TABLET, FILM COATED ORAL at 22:25

## 2018-11-10 RX ADMIN — Medication 10 MILLIGRAM(S): at 16:48

## 2018-11-10 RX ADMIN — ATORVASTATIN CALCIUM 40 MILLIGRAM(S): 80 TABLET, FILM COATED ORAL at 22:25

## 2018-11-10 RX ADMIN — Medication 10 MILLIGRAM(S): at 05:50

## 2018-11-10 RX ADMIN — Medication 25 MILLIGRAM(S): at 05:50

## 2018-11-10 RX ADMIN — Medication 1 DROP(S): at 22:25

## 2018-11-10 RX ADMIN — LISINOPRIL 2.5 MILLIGRAM(S): 2.5 TABLET ORAL at 22:26

## 2018-11-10 RX ADMIN — Medication 5 MILLIGRAM(S): at 22:25

## 2018-11-10 RX ADMIN — SODIUM CHLORIDE 50 MILLILITER(S): 9 INJECTION INTRAMUSCULAR; INTRAVENOUS; SUBCUTANEOUS at 16:47

## 2018-11-10 RX ADMIN — TAMSULOSIN HYDROCHLORIDE 0.4 MILLIGRAM(S): 0.4 CAPSULE ORAL at 22:25

## 2018-11-10 RX ADMIN — FINASTERIDE 5 MILLIGRAM(S): 5 TABLET, FILM COATED ORAL at 13:05

## 2018-11-10 NOTE — CONSULT NOTE ADULT - ASSESSMENT
Pt is a 89y old Male with hx of dementia, BPH, HTN. admitted from [facility] 10/7 s/p fall with laceration on left hand.  Hosp course notable for elevated troponins due to NSTEMI being managed medically, conservatively, echo revealing severe AS, urinary retention, unable to have michael placed now s/p suprapubic cath with some hematuria.  Palliative medicine consulted for assistance with goals of care. Pt is a 89y old Male with hx of dementia, BPH, HTN. admitted from [facility] 10/7 s/p fall with laceration on left hand.  Hosp course notable for elevated troponins due to NSTEMI being managed medically, conservatively, echo revealing severe AS, urinary retention, unable to have michael placed now s/p suprapubic cath with some hematuria.  Palliative medicine consulted for assistance with goals of care.    1)Fall  -s/p unwitnessed fall at [facility]  -Left hand laceration s/p sutures  -?cardiac related  -Fall precautions  -PT eval    2)NSTEMI  -Labs reviewed  -Cardio input reviewed  -Conservative management as pt not likely to benefit from aggressive interventions  -An ASA and statin  -If hematuria persists may need to re-eval ASA    3)Severe AS  -2D echo reviewed  -Cardio input reviewed  -As noted above pt poor candidate for aggressive interventions  -Medical management    4)Urinary Retention, Hydronephrosis  -S/p suprapubic cath as pt unable to have Michael placed  -Now with some hematuria in bag  -Monitor for now  -Monitor Hb and BP    5)Dementia  -Unclear baseline FAST  -Fall and aspiration precautions  -Avoid anticholinergics as reasonable  -Reorient as needed    6)Advance Directives, Goals of Care  -Pt does not have capacity to make medical decisions  -Pt has HCP naming his niece Lilian as his primary agent and his nephew Rey as his alternate agent  -Pt has DNR order in place per his niece  -will contact pts niece to schedule family meeting to address goals of care

## 2018-11-10 NOTE — PROVIDER CONTACT NOTE (CHANGE IN STATUS NOTIFICATION) - SITUATION
As I was emptying the Suprapubic catheter I notice the inner lumen becoming increasingly darker red urine.

## 2018-11-10 NOTE — GOALS OF CARE CONVERSATION - PERSONAL ADVANCE DIRECTIVE - CONVERSATION DETAILS
Spoke with pts HCP Lilian via phone this afternoon, as pt unable to participate in goals of care discussion due to dementia.  Reviewed with Lilian role of palliative medicine, pts course over last few months, with decline recently resulting in current admission.  she was aware of the details of his current condition including MI- being managed conservatively, urinary retention s/p suprapubic catheter now complicated by hematuria off antiplt agents.    she explained overall pt had been doing ok at [facility].  He was able to ambulate to the dining room there but recently had been having some difficulty walking but refused to use a walker.  Lilian shared he enjoys eating- she states he ate better than anyone she knew.  She explained for a while now she has been in contact with the [facility] working on getting pt in there.  there has been bed available but the director there, Monica Morelos [phone] has been in close contact with her and is hopeful a thompson will open soon so he can be moved there.  Lilian shared that when pt is medically ready for d/c, if there isno bed at [facility] she would like to have him go to [facility], because that is close to her home and she states pt really enjoys company.  If he needed to go to [place], still her ultimate goal is to get him to [facility].    Lilian shared that her  is ill and she is travelling out of [facility] to deal with her 's health but that she can be reached at any time via phone. she did not another family member (niece) Graciela Poole is local and will be coming to visit pt and can be contacted if she is unable to be reached.  Graciela's number is [phone].    Lilian expresses understanding that pt is very sick and she is not sure how long he has.  She states comfort is her primary goal for pt at this point.  However if something acute and reversible occurs she would wish to pursue treatment so long as it wasn't aggressive.  Based on her explanation of her goals we reviewed and completed a MOLST form.    MOLST was completed indicating limited medical interventions, DNR, DNI, do not rehospitalize, no feeding tube, trial of IVFs, determine use or limitation of abx.  After I spoke with Lilian, Karo (RN from ) called Lilian and witnessed the above decisions and form was completed.    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  She understands his health and wishes for the best quality of life for the time he has.  I advised her I would pass along her wishes in regard to discharge plan and that the MOLST form would follow him to his next care setting.    Spent 27 minutes via phone discussing advance directives, therapeutic alternatives, and advance care planning.    above d/w Dr. Perkins

## 2018-11-10 NOTE — PROGRESS NOTE ADULT - SUBJECTIVE AND OBJECTIVE BOX
HPI:  89 y/o man with a history of HTN, BPH admitted s/p unwitnessed fall. He was found to have NSTEMI, Aortic Stenosis, Cardiomyopathy.    18:  Comfortable; only complaining of tooth / mouth pain; denies: angina, dyspnea, orthopnea.  18: Comfortable. No arrhythmia     11/10/18 patient has hematuria , denies any chest pain   PAST MEDICAL & SURGICAL HISTORY:  Dementia  BPH (benign prostatic hyperplasia)  HTN (hypertension)  No significant past surgical history  MEDICATIONS  (STANDING):  aspirin  chewable 81 milliGRAM(s) Oral daily  atorvastatin 40 milliGRAM(s) Oral at bedtime  busPIRone 10 milliGRAM(s) Oral two times a day  donepezil 5 milliGRAM(s) Oral at bedtime  finasteride 5 milliGRAM(s) Oral daily  lisinopril 2.5 milliGRAM(s) Oral at bedtime  melatonin 5 milliGRAM(s) Oral at bedtime  metoprolol succinate ER 25 milliGRAM(s) Oral daily  sodium chloride 0.9%. 1000 milliLiter(s) (50 mL/Hr) IV Continuous <Continuous>  tamsulosin 0.4 milliGRAM(s) Oral at bedtime    MEDICATIONS  (PRN):  acetaminophen   Tablet .. 650 milliGRAM(s) Oral every 6 hours PRN Temp greater or equal to 38C (100.4F), Mild Pain (1 - 3)  naphazoline/pheniramine Solution 1 Drop(s) Both EYES four times a day PRN Dry and itchy eyes  nitroglycerin     SubLingual 0.4 milliGRAM(s) SubLingual every 5 minutes PRN Chest Pain    Vital Signs Last 24 Hrs  T(C): 36.7 (10 Nov 2018 10:52), Max: 36.9 (2018 18:57)  T(F): 98.1 (10 Nov 2018 10:52), Max: 98.4 (2018 18:57)  HR: 60 (10 Nov 2018 10:52) (59 - 72)  BP: 101/61 (10 Nov 2018 10:52) (98/52 - 116/63)  BP(mean): --  RR: 16 (10 Nov 2018 10:52) (16 - 17)  SpO2: 93% (10 Nov 2018 10:52) (93% - 96%)    I&O's Summary    2018 07:01  -  10 Nov 2018 07:00  --------------------------------------------------------  IN: 0 mL / OUT: 600 mL / NET: -600 mL      PHYSICAL EXAM:    Constitutional: NAD, awake and alert, well-developed  HEENT: PERR, EOMI,  No oral cyananosis.  Neck:  supple,  No JVD  Respiratory: Breath sounds are clear bilaterally, No wheezing, rales or rhonchi  Cardiovascular: S1 and S2, regular rate and rhythm,1/6 EVAN  Gastrointestinal: Bowel Sounds present, soft, nontender.   Extremities: No peripheral edema. No clubbing or cyanosis.  Vascular: 2+ peripheral pulses  Neurological: A/O x 3, no focal deficits  Musculoskeletal: no calf tenderness.  Skin: No rashes.      LABS: All Labs Reviewed:                         10.9   7.87  )-----------( 222      ( 10 Nov 2018 06:35 )             32.9     11-10    142  |  111<H>  |  38<H>  ----------------------------<  99  3.9   |  24  |  0.67    Ca    8.2<L>      10 Nov 2018 06:35  Mg     2.1     11-09      CARDIAC MARKERS ( 2018 06:07 )  0.787 ng/mL / x     / x     / x     / x              Urinalysis Basic - ( 2018 13:10 )    Color: Brown / Appearance: very cloudy / S.015 / pH: x  Gluc: x / Ketone: Trace  / Bili: Negative / Urobili: Negative mg/dL   Blood: x / Protein: 100 mg/dL / Nitrite: Negative   Leuk Esterase: Moderate / RBC: 25-50 /HPF / WBC 26-50   Sq Epi: x / Non Sq Epi: Few / Bacteria: Many          RADIOLOGY/EKG:< from: 12 Lead ECG (18 @ 07:21) >  Diagnosis Line Normal sinus rhythmwith sinus arrhythmia  Left axis deviation  Anterolateral infarct  Nonspecific ST abnormality  consider anterior wall ischemia  Abnormal ECG    Confirmed by EVELIA HOLT MD ([id number]) on 2018 8:24:06 AM    < end of copied text >    < from: Transthoracic Echocardiogram (18 @ 11:26) >  Impression     Summary     Endocardium is not always well visualized, however, overall left   ventricular systolic function appears decreased. There is a large area of   apical akinesis.   Estimated left ventricular ejection fraction is 35-40 %.   The left atrium is mildly dilated.   Normal appearing right atrium.   Normal appearing right ventricle structure and function.   Significant fibrocalcific changes noted to the aortic valve leaflets with   restriction in leaflet excursion. Peak transaortic gradient is 34 mmHg;   however, the calculated NOEMI is .94cm/2 this finding is consistent with   severe aortic stenosis.   Fibrocalcific changes noted to the mitral valve leaflets with preserved   leaflet excursion.   Mild mitral annular calcification is present.   Mild (1+) mitral regurgitation is present.   EA reversal of the mitral inflow consistent with reduced compliance of   the   left ventricle   Normal appearing tricuspid valve structure and function.   Mild (1+) tricuspid valve regurgitation is present.   A small, non hemodynamically significant effusion is present.     Signature     ----------------------------------------------------------------   Electronically signed by Israel Álvarez MD(Interpreting   physician) on 2018 04:26 PM   ----------------------------------------------------------------    < end of copied text >      ECHO/CARDIAC CATHTERIZATION/STRESS TEST:    Monitor sinus rhythm brief SVT

## 2018-11-10 NOTE — GOALS OF CARE CONVERSATION - PERSONAL ADVANCE DIRECTIVE - NS PRO AD PATIENT TYPE
Medical Orders for Life-Sustaining Treatment (MOLST)/Health Care Proxy (HCP)/Living Will/Do Not Resuscitate (DNR)

## 2018-11-10 NOTE — PROGRESS NOTE ADULT - SUBJECTIVE AND OBJECTIVE BOX
CHIEF COMPLAINT: urinary retention    HISTORY OF PRESENT ILLNESS: Status post insertion of suprapubic tube by IR. Tube currently draining but was "kinked." I repositioned and now draining better. Urine dark juan with some small blood. Pt much more comfortable.    PAST MEDICAL & SURGICAL HISTORY:  Dementia  BPH (benign prostatic hyperplasia)  HTN (hypertension)  No significant past surgical history      REVIEW OF SYSTEMS:Same previous  MEDICATIONS  (STANDING):  atorvastatin 40 milliGRAM(s) Oral at bedtime  busPIRone 10 milliGRAM(s) Oral two times a day  donepezil 5 milliGRAM(s) Oral at bedtime  finasteride 5 milliGRAM(s) Oral daily  lisinopril 2.5 milliGRAM(s) Oral at bedtime  melatonin 5 milliGRAM(s) Oral at bedtime  metoprolol succinate ER 25 milliGRAM(s) Oral daily  sodium chloride 0.9%. 1000 milliLiter(s) (50 mL/Hr) IV Continuous <Continuous>  tamsulosin 0.4 milliGRAM(s) Oral at bedtime    MEDICATIONS  (PRN):  acetaminophen   Tablet .. 650 milliGRAM(s) Oral every 6 hours PRN Temp greater or equal to 38C (100.4F), Mild Pain (1 - 3)  naphazoline/pheniramine Solution 1 Drop(s) Both EYES four times a day PRN Dry and itchy eyes  nitroglycerin     SubLingual 0.4 milliGRAM(s) SubLingual every 5 minutes PRN Chest Pain      PE:Same Previous    Allergies    No Known Allergies    Intolerances        SOCIAL HISTORY:Same previous    FAMILY HISTORY:  Family history of cancer (Mother)      Vital Signs Last 24 Hrs  T(C): 36.7 (10 Nov 2018 10:52), Max: 36.9 (09 Nov 2018 18:57)  T(F): 98.1 (10 Nov 2018 10:52), Max: 98.4 (09 Nov 2018 18:57)  HR: 60 (10 Nov 2018 10:52) (59 - 72)  BP: 101/61 (10 Nov 2018 10:52) (98/52 - 116/63)  BP(mean): --  RR: 16 (10 Nov 2018 10:52) (16 - 17)  SpO2: 93% (10 Nov 2018 10:52) (93% - 96%)    PHYSICAL EXAM:Same previous    LABS:                        11.0   x     )-----------( x        ( 10 Nov 2018 13:38 )             33.5     11-10    142  |  111<H>  |  38<H>  ----------------------------<  99  3.9   |  24  |  0.67    Ca    8.2<L>      10 Nov 2018 06:35  Mg     2.1     11-09          Urine Culture:     RADIOLOGY & ADDITIONAL STUDIES:

## 2018-11-10 NOTE — PROVIDER CONTACT NOTE (CHANGE IN STATUS NOTIFICATION) - ASSESSMENT
Pt BP remains baseline and stable. refer to vital chart. pt not complaining of pain or discomfort. will continue to monitor.

## 2018-11-10 NOTE — PROGRESS NOTE ADULT - PROBLEM SELECTOR PLAN 1
No angina; continue medical management with aspirin, Plavix, atorvastatin.
No angina; continue medical management with aspirin, Plavix, atorvastatin.
Successful placement of suprapubic tube. Will maintain tube for now. In future can be transitioned to suprapubic michael after tract matured. He would benefit from outpatient cystoscopy to determine reason for difficulty in michael placement.
Unable to insert michael. Discussed with IR to place suprapubic tube which is best course despite pt on plavix. Other options would normally be to take pt to or for cysto under anesthesia, but risk due to MI. Also, Further attempts to place michael from below per meatus would be traumatic and possibly cause difficult to control bleeding. Therefore spt recommended as best option at this tome and is on urgent basis.
No angina; continue medical management with aspirin, Plavix, atorvastatin. BB , increase the dose

## 2018-11-10 NOTE — PROGRESS NOTE ADULT - PROBLEM SELECTOR PLAN 3
Poor candidate for intervention; prognosis poor.
Poor candidate for intervention; prognosis poor. Palliative care and early discharge
Poor candidate for intervention; prognosis poor. Palliative care and early discharge

## 2018-11-10 NOTE — PROVIDER CONTACT NOTE (CHANGE IN STATUS NOTIFICATION) - BACKGROUND
pt was retaining urine and michael catheter was attempted to be inserted. pt however was unable to have michael placed.

## 2018-11-10 NOTE — PROGRESS NOTE ADULT - ASSESSMENT
· Assessment		  Fall w/ injury likely syncope vs ACS vs mechanical fall  Elevated troponins due to NSTEMI   - r/o CVA - CTH negative  - troponins elevated   - ~deep TWI on initial ECG in v1-v3; decrease in depth noted with f/u am ECG  - chest pain free  - asa//BB/ statin ordered  - plavix stopped due to ongoing hematuria  - 2 d echo - severe AS , EF 35%  - tele - some PVC   - cardiac consult  - not candidate for interventions  - pain mgmt with tylenol  - s/p 9 sutures in ED  - routine wound care to superficial BLE abrasions    * AS   - 2 d echo   - cardiology consult - not an candidate for interventions    Essential HTN  - BP stable (101-120's systolic)  - VS per routine  - home med norvasc on hold given initiation of BB /metoprolol  - con't lisinopril 2.5mg daily    Hematuria   Urinary retention with moderate bilateral hydronephrosis s/p suprapubic catheter   - stop heparin sq, monitor  - stop plavix   - c/w finasteride, add flomax  - us kidney - noted  - urology consult - unable to insert Verduzco, will need IR placement of suprapubic catheter  - d/w Dr. Bangura cardiologist - ok to stop plavix, patient high risk but there is no absolute contraindication for emergent procedure  - Revised cardiac risk - moderate 6.6 % of MI, cardiac arrest       Dementia / Alzheimers / Anxiety / Insomnia  - assist with ADL's as needed  - con't home meds: aricept,/ buspar / rapaflo / trazodone / melatonin    BPH  - con't proscar  - monitor I & O    Advanced directives   - 15 minutes spend   - goals of care d/w pt & HCP--> DNR  HCP: GISSELLHELENE Lilian Leal [phone], [phone]	  PCP Mynor Nath at [facility] [phone]	  ann Poole [phone]  - SW/PT/case management consult for post discharge needs

## 2018-11-10 NOTE — PROGRESS NOTE ADULT - SUBJECTIVE AND OBJECTIVE BOX
Subjective:  Patient is a 89y old  Male who presents with a chief complaint of fall, lacerations      HPI:  89 y/o male with a PMHx of HTN, BPH presents to the ED from [facility] s/p unwitnessed fall. Not on anticoagulants. Pt does not know why he is at the hospital. +laceration on the left 4 th finger   In the ED CTH / CT spine negative for acute findings; degenerative changes of spine;  he received 9 sutures to LUE 4th digit; troponin 0.391-->2.810-->2.950; ECG with TWI V1-v3; CXR w/ subtle airspace opacity at the right lower lobe cannot be excluded. No definite pleural effusions. Minimal fibrotic change at the left costophrenic angle is unchanged. Cardiomediastinal silhouette is intact.  Subjective: Denies CP/palpitation/SOB/HA/dizziness/abd pain/n/v/d/f/c; ECG NSR 70 with less pronounced TWI in v1-v4 than initial ECG yesterday on arrival to ED     18 - Patient seen and examined at bedside earlier today, denies cp, dyspnea, abdominal pain, afebrile   18 - pt seen and examined , denies CP, palpitations, abdominal pain, + hematuria, POC discussed at bedside  11/10 - pt seen and examined, gross hematuria from suprabupic cathether , denies pain, palpitations, tele - sinus     Review of system- Rest of the review of system are negative except mentioned in HPI    T(C): 36.7 (11-10-18 @ 10:52), Max: 36.9 (18 @ 18:57)  T(F): 98.1 (11-10-18 @ 10:52), Max: 98.4 (18 @ 18:57)  HR: 60 (11-10-18 @ 10:52) (59 - 72)  BP: 101/61 (11-10-18 @ 10:52) (98/52 - 116/63)  RR: 16 (11-10-18 @ 10:52) (16 - 17)  SpO2: 93% (11-10-18 @ 10:52) (93% - 96%)  Wt(kg): --    PHYSICAL EXAM:  GENERAL: NAD  NERVOUS SYSTEM:  Alert & Oriented X2, non- focal exam,  HEAD:  Atraumatic, Normocephalic  EYES: EOMI, PERRLA, conjunctiva and sclera clear  HEENT: Moist mucous membranes  NECK: Supple, No JVD  CHEST/LUNG: Clear to auscultation bilaterally; No rales, no rhonchi, no wheezing, or rubs  HEART: Regular rate and rhythm; No murmurs, rubs, or gallops  ABDOMEN: Soft, Nontender, Nondistended; Bowel sounds present  GENITOURINARY- Voiding, no suprapubic tenderness  EXTREMITIES:  2+ Peripheral Pulses, No clubbing, cyanosis, or edema  MUSCULOSKELETAL:- No muscle tenderness, Muscle tone normal, No joint tenderness, no Joint swelling, Joint range of motion-normal  SKIN-no rash, no lesion    LABS:  11-10    142  |  111<H>  |  38<H>  ----------------------------<  99  3.9   |  24  |  0.67    Ca    8.2<L>      10 Nov 2018 06:35  Mg     2.1                             10.9   7.87  )-----------( 222      ( 10 Nov 2018 06:35 )             32.9     CARDIAC MARKERS ( 2018 06:07 )  0.787 ng/mL / x     / x     / x     / x              142  |  109<H>  |  45<H>  ----------------------------<  110<H>  4.1   |  26  |  0.92    Ca    8.6      2018 06:07  Mg     2.1         TPro  5.7<L>  /  Alb  2.7<L>  /  TBili  0.6  /  DBili  x   /  AST  16  /  ALT  17  /  AlkPhos  64                          11.8   7.89  )-----------( 245      ( 2018 06:07 )             36.5       CARDIAC MARKERS ( 2018 06:07 )  0.787 ng/mL / x     / x     / x     / x      CARDIAC MARKERS ( 2018 17:48 )  2.390 ng/mL / x     / 73 U/L / x     / x        LIVER FUNCTIONS - ( 2018 06:27 )  Alb: 2.7 g/dL / Pro: 5.7 gm/dL / ALK PHOS: 64 U/L / ALT: 17 U/L / AST: 16 U/L / GGT: x                 Urinalysis Basic - ( 2018 13:10 )    Color: Brown / Appearance: very cloudy / S.015 / pH: x  Gluc: x / Ketone: Trace  / Bili: Negative / Urobili: Negative mg/dL   Blood: x / Protein: 100 mg/dL / Nitrite: Negative   Leuk Esterase: Moderate / RBC: 25-50 /HPF / WBC 26-50   Sq Epi: x / Non Sq Epi: Few / Bacteria: Many                            11.6   10.18 )-----------( 243      ( 2018 06:27 )             35.5     11-08    142  |  110<H>  |  41<H>  ----------------------------<  97  4.0   |  25  |  0.87    Ca    8.0<L>      2018 06:27  Mg     2.1     11-07    TPro  5.7<L>  /  Alb  2.7<L>  /  TBili  0.6  /  DBili  x   /  AST  16  /  ALT  17  /  AlkPhos  64  11-08    PT/INR - ( 2018 21:30 )   PT: 10.0 sec;   INR: 0.90 ratio         PTT - ( 2018 21:30 )  PTT:28.4 sec  CARDIAC MARKERS ( 2018 17:48 )  2.390 ng/mL / x     / 73 U/L / x     / x      CARDIAC MARKERS ( 2018 10:52 )  2.950 ng/mL / x     / 72 U/L / x     / x      CARDIAC MARKERS ( 2018 05:49 )  2.810 ng/mL / x     / x     / x     / x      CARDIAC MARKERS ( 2018 21:30 )  0.391 ng/mL / x     / x     / x     / x              CAPILLARY BLOOD GLUCOSE            RECENT CULTURES:    RADIOLOGY & ADDITIONAL TESTS:    < from: US Kidney and Bladder (18 @ 09:41) >  IMPRESSION:     Moderate bilateral hydroureteronephrosis and markedly distended urinary   bladder.      < end of copied text >    < from: CT Cervical Spine No Cont (18 @ 22:49) >  Head:  Parenchymal volume loss is noted with prominent ventricles andsulci.   Chronic microvascular ischemic changes are identified. No acute   territorial infarct is demonstrated.    There is no evidence of an acute hemorrhage or mass-effect in the   posterior fossa or in the supratentorial region.     Evaluation of the osseous structures with the appropriate window appears   unremarkable. Vascular calcifications are noted. Sequela of right lens   surgery is seen.    Cervical spine:  Bones: Straightening of the normal cervical lordosis is seen which is   likely due to muscle spasm versus patient positioning. Grade 1   anterolisthesis of C7 on T1 and T1 on T2 are noted. The cervical   vertebral body heights are maintained. No fracture is demonstrated. Disc   space narrowing and marginal osteophyte formation isseen throughout the   cervical and upper thoracic spine. Multilevel posterior disc osteophyte   complexes are seen without evidence of severe spinal canal stenosis.   Multilevel neural foraminal stenosis is noted.    Soft tissues: The prevertebral soft tissues are unremarkable. The thyroid   is unremarkable.    Lung apices: A right pleural effusion is noted.       IMPRESSION:   1. No acute territorial infarct, hemorrhage, mass effect or calvarial   fracture.  2. Multilevel degenerative changes ofthe cervical spine without evidence   of a fracture.      < end of copied text >    < from: Xray Chest 1 View- PORTABLE-Urgent (18 @ 23:01) >    Subtle airspace opacity at the right lower lobe cannot be excluded. No   definite pleural effusions. Minimal fibrotic change at the left   costophrenic angle is unchanged  .      Cardiomediastinal silhouette is intact.    < end of copied text >    MEDICATIONS  (STANDING):  aspirin  chewable 81 milliGRAM(s) Oral daily  atorvastatin 40 milliGRAM(s) Oral at bedtime  busPIRone 10 milliGRAM(s) Oral two times a day  donepezil 5 milliGRAM(s) Oral at bedtime  finasteride 5 milliGRAM(s) Oral daily  lisinopril 2.5 milliGRAM(s) Oral at bedtime  melatonin 5 milliGRAM(s) Oral at bedtime  metoprolol succinate ER 25 milliGRAM(s) Oral daily  sodium chloride 0.9%. 1000 milliLiter(s) (50 mL/Hr) IV Continuous <Continuous>  tamsulosin 0.4 milliGRAM(s) Oral at bedtime  traZODone 100 milliGRAM(s) Oral at bedtime    MEDICATIONS  (PRN):  acetaminophen   Tablet .. 650 milliGRAM(s) Oral every 6 hours PRN Temp greater or equal to 38C (100.4F), Mild Pain (1 - 3)  naphazoline/pheniramine Solution 1 Drop(s) Both EYES four times a day PRN Dry and itchy eyes  nitroglycerin     SubLingual 0.4 milliGRAM(s) SubLingual every 5 minutes PRN Chest Pain

## 2018-11-10 NOTE — PROGRESS NOTE ADULT - PROBLEM SELECTOR PROBLEM 1
NSTEMI, initial episode of care
NSTEMI, initial episode of care
Urinary retention
Urinary retention
NSTEMI, initial episode of care

## 2018-11-11 LAB
-  AMIKACIN: SIGNIFICANT CHANGE UP
-  AMOXICILLIN/CLAVULANIC ACID: SIGNIFICANT CHANGE UP
-  AMPICILLIN/SULBACTAM: SIGNIFICANT CHANGE UP
-  AMPICILLIN: SIGNIFICANT CHANGE UP
-  AZTREONAM: SIGNIFICANT CHANGE UP
-  CEFAZOLIN: SIGNIFICANT CHANGE UP
-  CEFEPIME: SIGNIFICANT CHANGE UP
-  CEFOXITIN: SIGNIFICANT CHANGE UP
-  CEFTRIAXONE: SIGNIFICANT CHANGE UP
-  CIPROFLOXACIN: SIGNIFICANT CHANGE UP
-  ERTAPENEM: SIGNIFICANT CHANGE UP
-  GENTAMICIN: SIGNIFICANT CHANGE UP
-  IMIPENEM: SIGNIFICANT CHANGE UP
-  LEVOFLOXACIN: SIGNIFICANT CHANGE UP
-  MEROPENEM: SIGNIFICANT CHANGE UP
-  NITROFURANTOIN: SIGNIFICANT CHANGE UP
-  PIPERACILLIN/TAZOBACTAM: SIGNIFICANT CHANGE UP
-  TIGECYCLINE: SIGNIFICANT CHANGE UP
-  TOBRAMYCIN: SIGNIFICANT CHANGE UP
-  TRIMETHOPRIM/SULFAMETHOXAZOLE: SIGNIFICANT CHANGE UP
ANION GAP SERPL CALC-SCNC: 6 MMOL/L — SIGNIFICANT CHANGE UP (ref 5–17)
BUN SERPL-MCNC: 27 MG/DL — HIGH (ref 7–23)
CALCIUM SERPL-MCNC: 8 MG/DL — LOW (ref 8.5–10.1)
CHLORIDE SERPL-SCNC: 110 MMOL/L — HIGH (ref 96–108)
CO2 SERPL-SCNC: 25 MMOL/L — SIGNIFICANT CHANGE UP (ref 22–31)
CREAT SERPL-MCNC: 0.76 MG/DL — SIGNIFICANT CHANGE UP (ref 0.5–1.3)
CULTURE RESULTS: SIGNIFICANT CHANGE UP
FERRITIN SERPL-MCNC: 51 NG/ML — SIGNIFICANT CHANGE UP (ref 30–400)
FOLATE SERPL-MCNC: 10.5 NG/ML — SIGNIFICANT CHANGE UP
GLUCOSE SERPL-MCNC: 96 MG/DL — SIGNIFICANT CHANGE UP (ref 70–99)
HCT VFR BLD CALC: 33.1 % — LOW (ref 39–50)
HGB BLD-MCNC: 11 G/DL — LOW (ref 13–17)
IRON SATN MFR SERPL: 15 % — LOW (ref 16–55)
IRON SATN MFR SERPL: 32 UG/DL — LOW (ref 45–165)
MCHC RBC-ENTMCNC: 29.3 PG — SIGNIFICANT CHANGE UP (ref 27–34)
MCHC RBC-ENTMCNC: 33.2 GM/DL — SIGNIFICANT CHANGE UP (ref 32–36)
MCV RBC AUTO: 88 FL — SIGNIFICANT CHANGE UP (ref 80–100)
METHOD TYPE: SIGNIFICANT CHANGE UP
NRBC # BLD: 0 /100 WBCS — SIGNIFICANT CHANGE UP (ref 0–0)
ORGANISM # SPEC MICROSCOPIC CNT: SIGNIFICANT CHANGE UP
ORGANISM # SPEC MICROSCOPIC CNT: SIGNIFICANT CHANGE UP
PLATELET # BLD AUTO: 225 K/UL — SIGNIFICANT CHANGE UP (ref 150–400)
POTASSIUM SERPL-MCNC: 3.8 MMOL/L — SIGNIFICANT CHANGE UP (ref 3.5–5.3)
POTASSIUM SERPL-SCNC: 3.8 MMOL/L — SIGNIFICANT CHANGE UP (ref 3.5–5.3)
RBC # BLD: 3.76 M/UL — LOW (ref 4.2–5.8)
RBC # FLD: 14.3 % — SIGNIFICANT CHANGE UP (ref 10.3–14.5)
SODIUM SERPL-SCNC: 141 MMOL/L — SIGNIFICANT CHANGE UP (ref 135–145)
SPECIMEN SOURCE: SIGNIFICANT CHANGE UP
TIBC SERPL-MCNC: 218 UG/DL — LOW (ref 220–430)
UIBC SERPL-MCNC: 186 UG/DL — SIGNIFICANT CHANGE UP (ref 110–370)
VIT B12 SERPL-MCNC: 645 PG/ML — SIGNIFICANT CHANGE UP (ref 232–1245)
WBC # BLD: 8.51 K/UL — SIGNIFICANT CHANGE UP (ref 3.8–10.5)
WBC # FLD AUTO: 8.51 K/UL — SIGNIFICANT CHANGE UP (ref 3.8–10.5)

## 2018-11-11 RX ORDER — CEFTRIAXONE 500 MG/1
1000 INJECTION, POWDER, FOR SOLUTION INTRAMUSCULAR; INTRAVENOUS EVERY 24 HOURS
Qty: 0 | Refills: 0 | Status: DISCONTINUED | OUTPATIENT
Start: 2018-11-12 | End: 2018-11-13

## 2018-11-11 RX ORDER — ASPIRIN/CALCIUM CARB/MAGNESIUM 324 MG
81 TABLET ORAL DAILY
Qty: 0 | Refills: 0 | Status: DISCONTINUED | OUTPATIENT
Start: 2018-11-12 | End: 2018-11-15

## 2018-11-11 RX ORDER — CEFTRIAXONE 500 MG/1
INJECTION, POWDER, FOR SOLUTION INTRAMUSCULAR; INTRAVENOUS
Qty: 0 | Refills: 0 | Status: DISCONTINUED | OUTPATIENT
Start: 2018-11-11 | End: 2018-11-11

## 2018-11-11 RX ORDER — CEFTRIAXONE 500 MG/1
1000 INJECTION, POWDER, FOR SOLUTION INTRAMUSCULAR; INTRAVENOUS ONCE
Qty: 0 | Refills: 0 | Status: COMPLETED | OUTPATIENT
Start: 2018-11-11 | End: 2018-11-11

## 2018-11-11 RX ORDER — CEFTRIAXONE 500 MG/1
INJECTION, POWDER, FOR SOLUTION INTRAMUSCULAR; INTRAVENOUS
Qty: 0 | Refills: 0 | Status: DISCONTINUED | OUTPATIENT
Start: 2018-11-11 | End: 2018-11-13

## 2018-11-11 RX ADMIN — Medication 10 MILLIGRAM(S): at 17:33

## 2018-11-11 RX ADMIN — Medication 25 MILLIGRAM(S): at 05:28

## 2018-11-11 RX ADMIN — DONEPEZIL HYDROCHLORIDE 5 MILLIGRAM(S): 10 TABLET, FILM COATED ORAL at 21:54

## 2018-11-11 RX ADMIN — TAMSULOSIN HYDROCHLORIDE 0.4 MILLIGRAM(S): 0.4 CAPSULE ORAL at 21:54

## 2018-11-11 RX ADMIN — LISINOPRIL 2.5 MILLIGRAM(S): 2.5 TABLET ORAL at 21:54

## 2018-11-11 RX ADMIN — CEFTRIAXONE 1000 MILLIGRAM(S): 500 INJECTION, POWDER, FOR SOLUTION INTRAMUSCULAR; INTRAVENOUS at 12:10

## 2018-11-11 RX ADMIN — ATORVASTATIN CALCIUM 40 MILLIGRAM(S): 80 TABLET, FILM COATED ORAL at 21:54

## 2018-11-11 RX ADMIN — FINASTERIDE 5 MILLIGRAM(S): 5 TABLET, FILM COATED ORAL at 12:10

## 2018-11-11 RX ADMIN — Medication 5 MILLIGRAM(S): at 21:54

## 2018-11-11 RX ADMIN — Medication 10 MILLIGRAM(S): at 05:28

## 2018-11-11 NOTE — PROGRESS NOTE ADULT - SUBJECTIVE AND OBJECTIVE BOX
HPI: Pt is a 89y old Male with hx of dementia, BPH, HTN. admitted from [facility] 10/7 s/p fall with laceration on left hand.  Hosp course notable for elevated troponins due to NSTEMI being managed medically, conservatively, echo revealing severe AS, urinary retention, unable to have michael placed now s/p suprapubic cath with some hematuria.  Palliative medicine consulted for assistance with goals of care.    Pt seen and examined by me with no family present at bedside for followup of goals of care.  Pt is very poor historian due to dementia.  He states he feels good.  He denies any pain- even when specifically asked about suprapubic cath.  He denies SOB, nausea.  He cannot recall why he is here.  Unable to obtain detailed hx, ROS due to dementia.  Pt appears comfortable.      PAIN: ( )Yes   ( x)No    DYSPNEA: ( ) Yes  (x ) No    Review of Systems:    Anxiety-  Depression-  Physical Discomfort- denies  Dyspnea- denies  Constipation-  Diarrhea-  Nausea-  Vomiting-  Anorexia-  Weight Loss-   Cough-  Secretions-  Fatigue-  Weakness-  Delirium-    Limited due to: dementia      PHYSICAL EXAM:    Vital Signs Last 24 Hrs  T(C): 36.8 (11 Nov 2018 11:08), Max: 36.8 (11 Nov 2018 11:08)  T(F): 98.2 (11 Nov 2018 11:08), Max: 98.2 (11 Nov 2018 11:08)  HR: 72 (11 Nov 2018 11:08) (66 - 72)  BP: 113/46 (11 Nov 2018 11:08) (110/53 - 120/59)  RR: 17 (11 Nov 2018 11:08) (16 - 17)  SpO2: 96% (11 Nov 2018 11:08) (94% - 96%)      PPSV2: 40  %  FAST: 6    General: pleasant, comfortable  Mental Status: oriented to self only, follows commands  HEENT: EOMI, moist oral mucosa  Lungs: decreased breath sounds bivasilar  Cardiac: S1S2+  GI: soft, NT, + bowel sounds  : suprapubic cath in place with dark yellow urine in bag  Ext: moves all 4 exts  Neuro: poor historian due to cognitive impairment, follows commands, no focal deficits      LABS                          11.0   8.51  )-----------( 225      ( 11 Nov 2018 05:59 )             33.1     11-11    141  |  110<H>  |  27<H>  ----------------------------<  96  3.8   |  25  |  0.76    Ca    8.0<L>      11 Nov 2018 05:59      RADIOLOGY/ADDITIONAL STUDIES:    No new studies since 11/9

## 2018-11-11 NOTE — PROGRESS NOTE ADULT - ASSESSMENT
· Assessment		  Fall w/ injury likely syncope vs ACS vs mechanical fall  Elevated troponins due to NSTEMI   - r/o CVA - CTH negative  - troponins elevated   - ~deep TWI on initial ECG in v1-v3; decrease in depth noted with f/u am ECG  - chest pain free  - asa//BB/ statin ordered  - plavix stopped due to ongoing hematuria  - 2 d echo - severe AS , EF 35%  - tele - some PVC   - cardiac consult  - not candidate for interventions  - pain mgmt with tylenol  - s/p 9 sutures in ED  - routine wound care to superficial BLE abrasions    * AS   - 2 d echo   - cardiology consult - not an candidate for interventions    Essential HTN  - BP stable (101-120's systolic)  - VS per routine  - home med norvasc on hold given initiation of BB /metoprolol  - con't lisinopril 2.5mg daily    Hematuria , resolved  Urinary retention with moderate bilateral hydronephrosis s/p suprapubic catheter   - stop heparin sq, monitor  - stop plavix , will restart asa 81   - c/w finasteride, add flomax  - us kidney - noted  - urology consult - unable to insert Verduzco, will need IR placement of suprapubic catheter  - d/w Dr. Bangura cardiologist - ok to stop plavix, patient high risk but there is no absolute contraindication for emergent procedure  - Revised cardiac risk - moderate 6.6 % of MI, cardiac arrest       Dementia / Alzheimers / Anxiety / Insomnia  - assist with ADL's as needed  - con't home meds: aricept,/ buspar / rapaflo / trazodone / melatonin    BPH  - con't proscar  - monitor I & O    Advanced directives   - 15 minutes spend   - goals of care d/w pt & HCP--> DNR  HCP: ANN Lilian Leal [phone], [phone]	  PCP Mynor Nath at [facility] [phone]	  ann Graciela Poole [phone]  - /PT/case management consult for post discharge needs · Assessment		  Fall w/ injury likely syncope vs ACS vs mechanical fall  Elevated troponins due to NSTEMI   - r/o CVA - CTH negative  - troponins elevated   - ~deep TWI on initial ECG in v1-v3; decrease in depth noted with f/u am ECG  - chest pain free  - asa//BB/ statin ordered  - plavix stopped due to ongoing hematuria  - 2 d echo - severe AS , EF 35%  - tele - some PVC   - cardiac consult  - not candidate for interventions  - pain mgmt with tylenol  - s/p 9 sutures in ED  - routine wound care to superficial BLE abrasions    * UTI due to E coli  - start ceftriaxone     * AS   - 2 d echo   - cardiology consult - not an candidate for interventions    Essential HTN  - BP stable (101-120's systolic)  - VS per routine  - home med norvasc on hold given initiation of BB /metoprolol  - con't lisinopril 2.5mg daily    Hematuria , resolved  Urinary retention with moderate bilateral hydronephrosis s/p suprapubic catheter   - stop heparin sq, monitor  - stop plavix , will restart asa 81   - c/w finasteride, add flomax  - us kidney - noted  - urology consult - unable to insert Verduzco, will need IR placement of suprapubic catheter  - d/w Dr. Bangura cardiologist - ok to stop plavix, patient high risk but there is no absolute contraindication for emergent procedure  - Revised cardiac risk - moderate 6.6 % of MI, cardiac arrest       Dementia / Alzheimers / Anxiety / Insomnia  - assist with ADL's as needed  - con't home meds: aricept,/ buspar / rapaflo / trazodone / melatonin    BPH  - con't proscar  - monitor I & O    Advanced directives   - 15 minutes spend   - goals of care d/w pt & HCP--> DNR  HCP: GISSELLHELENE Lilian Leal [phone], [phone]	  PCP Mynor Nath at [facility] [phone]	  ann Graciela Poole [phone]  - /PT/case management consult for post discharge needs

## 2018-11-11 NOTE — PROGRESS NOTE ADULT - ASSESSMENT
Pt is a 89y old Male with hx of dementia, BPH, HTN. admitted from [facility] 10/7 s/p fall with laceration on left hand.  Hosp course notable for elevated troponins due to NSTEMI being managed medically, conservatively, echo revealing severe AS, urinary retention, unable to have michael placed now s/p suprapubic cath with some hematuria.  Palliative medicine consulted for assistance with goals of care.    1)Fall  -s/p unwitnessed fall at [facility]  -Left hand laceration s/p sutures  -?cardiac related  -Fall precautions  -PT eval    2)NSTEMI  -Labs reviewed  -Cardio input reviewed  -Conservative management as pt not likely to benefit from aggressive interventions  - on statin  - ASA and Plavix D/gen due to hematuria    3)Severe AS  -2D echo reviewed  -Cardio input reviewed  -As noted above pt poor candidate for aggressive interventions  -Medical management    4)Urinary Retention, Hydronephrosis  -S/p suprapubic cath as pt unable to have Michael placed  -Had some hematuria initially but now resolved  -Monitor for now    5)Dementia  -Unclear baseline FAST  -Fall and aspiration precautions  -Avoid anticholinergics as reasonable  -Reorient as needed    6)Advance Directives, Goals of Care  -Pt does not have capacity to make medical decisions  -Pt has HCP naming his niece Lilian as his primary agent and his nephew Rey as his alternate agent  -Family meeting held 11/10 via phone with pts niece/HCP Lilian.  Please see goals of care note for details- in summary limited MOLST (with mostly comfort measures) completed.  Lilian has been working with [facility] to get pt over there.  If upon D/c no bed available at [facility] Lilian is agreeable to [facility] and requests [facility] [place] as first choice.    at this time pt has no symptoms, goals of care clarified, and tentative dispo plan in place will sign off.  Please recall if our team can be of further assistance    D/w Dr. Perkins

## 2018-11-11 NOTE — PROGRESS NOTE ADULT - SUBJECTIVE AND OBJECTIVE BOX
Subjective:  Patient is a 89y old  Male who presents with a chief complaint of fall, lacerations      HPI:  89 y/o male with a PMHx of HTN, BPH presents to the ED from [facility] s/p unwitnessed fall. Not on anticoagulants. Pt does not know why he is at the hospital. +laceration on the left 4 th finger   In the ED CTH / CT spine negative for acute findings; degenerative changes of spine;  he received 9 sutures to LUE 4th digit; troponin 0.391-->2.810-->2.950; ECG with TWI V1-v3; CXR w/ subtle airspace opacity at the right lower lobe cannot be excluded. No definite pleural effusions. Minimal fibrotic change at the left costophrenic angle is unchanged. Cardiomediastinal silhouette is intact.  Subjective: Denies CP/palpitation/SOB/HA/dizziness/abd pain/n/v/d/f/c; ECG NSR 70 with less pronounced TWI in v1-v4 than initial ECG yesterday on arrival to ED     18 - Patient seen and examined at bedside earlier today, denies cp, dyspnea, abdominal pain, afebrile   18 - pt seen and examined , denies CP, palpitations, abdominal pain, + hematuria, POC discussed at bedside  11/10 - pt seen and examined, gross hematuria from suprabupic cathether , denies pain, palpitations, tele - sinus    - pt seen and examined, denies abdominal pain, suprapubic cath drains clear urine, afebrile, eager to go to [facility]     Review of system- Rest of the review of system are negative except mentioned in HPI    T(C): 36.8 (18 @ 11:08), Max: 36.8 (18 @ 11:08)  T(F): 98.2 (18 @ 11:08), Max: 98.2 (18 @ 11:08)  HR: 72 (18 @ 11:08) (66 - 72)  BP: 113/46 (18 @ 11:08) (110/53 - 120/59)  RR: 17 (18 @ 11:08) (16 - 17)  SpO2: 96% (18 @ 11:08) (94% - 96%)  Wt(kg): --    PHYSICAL EXAM:  GENERAL: NAD  NERVOUS SYSTEM:  Alert & Oriented X2, non- focal exam,  HEAD:  Atraumatic, Normocephalic  EYES: EOMI, PERRLA, conjunctiva and sclera clear  HEENT: Moist mucous membranes  NECK: Supple, No JVD  CHEST/LUNG: Clear to auscultation bilaterally; No rales, no rhonchi, no wheezing, or rubs  HEART: Regular rate and rhythm; No murmurs, rubs, or gallops  ABDOMEN: Soft, Nontender, Nondistended; Bowel sounds present  GENITOURINARY- Voiding, no suprapubic tenderness  EXTREMITIES:  2+ Peripheral Pulses, No clubbing, cyanosis, or edema  MUSCULOSKELETAL:- No muscle tenderness, Muscle tone normal, No joint tenderness, no Joint swelling, Joint range of motion-normal  SKIN-no rash, no lesion    LABS:      141  |  110<H>  |  27<H>  ----------------------------<  96  3.8   |  25  |  0.76    Ca    8.0<L>      2018 05:59                       11.0   8.51  )-----------( 225      ( 2018 05:59 )             33.1       11-10    142  |  111<H>  |  38<H>  ----------------------------<  99  3.9   |  24  |  0.67    Ca    8.2<L>      10 Nov 2018 06:35  Mg     2.1                             10.9   7.87  )-----------( 222      ( 10 Nov 2018 06:35 )             32.9     CARDIAC MARKERS ( 2018 06:07 )  0.787 ng/mL / x     / x     / x     / x              142  |  109<H>  |  45<H>  ----------------------------<  110<H>  4.1   |  26  |  0.92    Ca    8.6      2018 06:07  Mg     2.1     11-    TPro  5.7<L>  /  Alb  2.7<L>  /  TBili  0.6  /  DBili  x   /  AST  16  /  ALT  17  /  AlkPhos  64                          11.8   7.89  )-----------( 245      ( 2018 06:07 )             36.5       CARDIAC MARKERS ( 2018 06:07 )  0.787 ng/mL / x     / x     / x     / x      CARDIAC MARKERS ( 2018 17:48 )  2.390 ng/mL / x     / 73 U/L / x     / x        LIVER FUNCTIONS - ( 2018 06:27 )  Alb: 2.7 g/dL / Pro: 5.7 gm/dL / ALK PHOS: 64 U/L / ALT: 17 U/L / AST: 16 U/L / GGT: x                 Urinalysis Basic - ( 2018 13:10 )    Color: Brown / Appearance: very cloudy / S.015 / pH: x  Gluc: x / Ketone: Trace  / Bili: Negative / Urobili: Negative mg/dL   Blood: x / Protein: 100 mg/dL / Nitrite: Negative   Leuk Esterase: Moderate / RBC: 25-50 /HPF / WBC 26-50   Sq Epi: x / Non Sq Epi: Few / Bacteria: Many                            11.6   10.18 )-----------( 243      ( 2018 06:27 )             35.5     1108    142  |  110<H>  |  41<H>  ----------------------------<  97  4.0   |  25  |  0.87    Ca    8.0<L>      2018 06:27  Mg     2.1     07    TPro  5.7<L>  /  Alb  2.7<L>  /  TBili  0.6  /  DBili  x   /  AST  16  /  ALT  17  /  AlkPhos  64      PT/INR - ( 2018 21:30 )   PT: 10.0 sec;   INR: 0.90 ratio         PTT - ( 2018 21:30 )  PTT:28.4 sec  CARDIAC MARKERS ( 2018 17:48 )  2.390 ng/mL / x     / 73 U/L / x     / x      CARDIAC MARKERS ( 2018 10:52 )  2.950 ng/mL / x     / 72 U/L / x     / x      CARDIAC MARKERS ( 2018 05:49 )  2.810 ng/mL / x     / x     / x     / x      CARDIAC MARKERS ( 2018 21:30 )  0.391 ng/mL / x     / x     / x     / x              CAPILLARY BLOOD GLUCOSE            RECENT CULTURES:    RADIOLOGY & ADDITIONAL TESTS:    < from: US Kidney and Bladder (18 @ 09:41) >  IMPRESSION:     Moderate bilateral hydroureteronephrosis and markedly distended urinary   bladder.      < end of copied text >    < from: CT Cervical Spine No Cont (18 @ 22:49) >  Head:  Parenchymal volume loss is noted with prominent ventricles andsulci.   Chronic microvascular ischemic changes are identified. No acute   territorial infarct is demonstrated.    There is no evidence of an acute hemorrhage or mass-effect in the   posterior fossa or in the supratentorial region.     Evaluation of the osseous structures with the appropriate window appears   unremarkable. Vascular calcifications are noted. Sequela of right lens   surgery is seen.    Cervical spine:  Bones: Straightening of the normal cervical lordosis is seen which is   likely due to muscle spasm versus patient positioning. Grade 1   anterolisthesis of C7 on T1 and T1 on T2 are noted. The cervical   vertebral body heights are maintained. No fracture is demonstrated. Disc   space narrowing and marginal osteophyte formation isseen throughout the   cervical and upper thoracic spine. Multilevel posterior disc osteophyte   complexes are seen without evidence of severe spinal canal stenosis.   Multilevel neural foraminal stenosis is noted.    Soft tissues: The prevertebral soft tissues are unremarkable. The thyroid   is unremarkable.    Lung apices: A right pleural effusion is noted.       IMPRESSION:   1. No acute territorial infarct, hemorrhage, mass effect or calvarial   fracture.  2. Multilevel degenerative changes ofthe cervical spine without evidence   of a fracture.      < end of copied text >    < from: Xray Chest 1 View- PORTABLE-Urgent (18 @ 23:01) >    Subtle airspace opacity at the right lower lobe cannot be excluded. No   definite pleural effusions. Minimal fibrotic change at the left   costophrenic angle is unchanged  .      Cardiomediastinal silhouette is intact.    < end of copied text >    MEDICATIONS  (STANDING):  aspirin  chewable 81 milliGRAM(s) Oral daily  atorvastatin 40 milliGRAM(s) Oral at bedtime  busPIRone 10 milliGRAM(s) Oral two times a day  donepezil 5 milliGRAM(s) Oral at bedtime  finasteride 5 milliGRAM(s) Oral daily  lisinopril 2.5 milliGRAM(s) Oral at bedtime  melatonin 5 milliGRAM(s) Oral at bedtime  metoprolol succinate ER 25 milliGRAM(s) Oral daily  sodium chloride 0.9%. 1000 milliLiter(s) (50 mL/Hr) IV Continuous <Continuous>  tamsulosin 0.4 milliGRAM(s) Oral at bedtime  traZODone 100 milliGRAM(s) Oral at bedtime    MEDICATIONS  (PRN):  acetaminophen   Tablet .. 650 milliGRAM(s) Oral every 6 hours PRN Temp greater or equal to 38C (100.4F), Mild Pain (1 - 3)  naphazoline/pheniramine Solution 1 Drop(s) Both EYES four times a day PRN Dry and itchy eyes  nitroglycerin     SubLingual 0.4 milliGRAM(s) SubLingual every 5 minutes PRN Chest Pain Subjective:  Patient is a 89y old  Male who presents with a chief complaint of fall, lacerations      HPI:  89 y/o male with a PMHx of HTN, BPH presents to the ED from [facility] s/p unwitnessed fall. Not on anticoagulants. Pt does not know why he is at the hospital. +laceration on the left 4 th finger   In the ED CTH / CT spine negative for acute findings; degenerative changes of spine;  he received 9 sutures to LUE 4th digit; troponin 0.391-->2.810-->2.950; ECG with TWI V1-v3; CXR w/ subtle airspace opacity at the right lower lobe cannot be excluded. No definite pleural effusions. Minimal fibrotic change at the left costophrenic angle is unchanged. Cardiomediastinal silhouette is intact.  Subjective: Denies CP/palpitation/SOB/HA/dizziness/abd pain/n/v/d/f/c; ECG NSR 70 with less pronounced TWI in v1-v4 than initial ECG yesterday on arrival to ED     18 - Patient seen and examined at bedside earlier today, denies cp, dyspnea, abdominal pain, afebrile   18 - pt seen and examined , denies CP, palpitations, abdominal pain, + hematuria, POC discussed at bedside  11/10 - pt seen and examined, gross hematuria from suprabupic cathether , denies pain, palpitations, tele - sinus    - pt seen and examined, denies abdominal pain, suprapubic cath drains clear urine, afebrile, eager to go to [facility]     Review of system- Rest of the review of system are negative except mentioned in HPI    T(C): 36.8 (18 @ 11:08), Max: 36.8 (18 @ 11:08)  T(F): 98.2 (18 @ 11:08), Max: 98.2 (18 @ 11:08)  HR: 72 (18 @ 11:08) (66 - 72)  BP: 113/46 (18 @ 11:08) (110/53 - 120/59)  RR: 17 (18 @ 11:08) (16 - 17)  SpO2: 96% (18 @ 11:08) (94% - 96%)  Wt(kg): --    PHYSICAL EXAM:  GENERAL: NAD  NERVOUS SYSTEM:  Alert & Oriented X2, non- focal exam,  HEAD:  Atraumatic, Normocephalic  EYES: EOMI, PERRLA, conjunctiva and sclera clear  HEENT: Moist mucous membranes  NECK: Supple, No JVD  CHEST/LUNG: Clear to auscultation bilaterally; No rales, no rhonchi, no wheezing, or rubs  HEART: Regular rate and rhythm; No murmurs, rubs, or gallops  ABDOMEN: Soft, Nontender, Nondistended; Bowel sounds present  GENITOURINARY- Voiding, no suprapubic tenderness  EXTREMITIES:  2+ Peripheral Pulses, No clubbing, cyanosis, or edema  MUSCULOSKELETAL:- No muscle tenderness, Muscle tone normal, No joint tenderness, no Joint swelling, Joint range of motion-normal  SKIN-no rash, no lesion    LABS:      141  |  110<H>  |  27<H>  ----------------------------<  96  3.8   |  25  |  0.76    Ca    8.0<L>      2018 05:59                       11.0   8.51  )-----------( 225      ( 2018 05:59 )             33.1       11-10    142  |  111<H>  |  38<H>  ----------------------------<  99  3.9   |  24  |  0.67    Ca    8.2<L>      10 Nov 2018 06:35  Mg     2.1                             10.9   7.87  )-----------( 222      ( 10 Nov 2018 06:35 )             32.9     CARDIAC MARKERS ( 2018 06:07 )  0.787 ng/mL / x     / x     / x     / x              142  |  109<H>  |  45<H>  ----------------------------<  110<H>  4.1   |  26  |  0.92    Ca    8.6      2018 06:07  Mg     2.1     11-    TPro  5.7<L>  /  Alb  2.7<L>  /  TBili  0.6  /  DBili  x   /  AST  16  /  ALT  17  /  AlkPhos  64                          11.8   7.89  )-----------( 245      ( 2018 06:07 )             36.5       CARDIAC MARKERS ( 2018 06:07 )  0.787 ng/mL / x     / x     / x     / x      CARDIAC MARKERS ( 2018 17:48 )  2.390 ng/mL / x     / 73 U/L / x     / x        LIVER FUNCTIONS - ( 2018 06:27 )  Alb: 2.7 g/dL / Pro: 5.7 gm/dL / ALK PHOS: 64 U/L / ALT: 17 U/L / AST: 16 U/L / GGT: x                 Urinalysis Basic - ( 2018 13:10 )    Color: Brown / Appearance: very cloudy / S.015 / pH: x  Gluc: x / Ketone: Trace  / Bili: Negative / Urobili: Negative mg/dL   Blood: x / Protein: 100 mg/dL / Nitrite: Negative   Leuk Esterase: Moderate / RBC: 25-50 /HPF / WBC 26-50   Sq Epi: x / Non Sq Epi: Few / Bacteria: Many                            11.6   10.18 )-----------( 243      ( 2018 06:27 )             35.5     1108    142  |  110<H>  |  41<H>  ----------------------------<  97  4.0   |  25  |  0.87    Ca    8.0<L>      2018 06:27  Mg     2.1     07    TPro  5.7<L>  /  Alb  2.7<L>  /  TBili  0.6  /  DBili  x   /  AST  16  /  ALT  17  /  AlkPhos  64      PT/INR - ( 2018 21:30 )   PT: 10.0 sec;   INR: 0.90 ratio         PTT - ( 2018 21:30 )  PTT:28.4 sec  CARDIAC MARKERS ( 2018 17:48 )  2.390 ng/mL / x     / 73 U/L / x     / x      CARDIAC MARKERS ( 2018 10:52 )  2.950 ng/mL / x     / 72 U/L / x     / x      CARDIAC MARKERS ( 2018 05:49 )  2.810 ng/mL / x     / x     / x     / x      CARDIAC MARKERS ( 2018 21:30 )  0.391 ng/mL / x     / x     / x     / x              CAPILLARY BLOOD GLUCOSE            RECENT CULTURES:  Culture - Urine (18 @ 13:10)    -  Amikacin: S <=8    -  Amoxicillin/Clavulanic Acid: S <=8/4    -  Ampicillin: R >16 These ampicillin results predict results for amoxicillin    -  Ampicillin/Sulbactam: S 8/4    -  Aztreonam: S <=4    -  Cefazolin: S <=2 For uncomplicated UTI with K. pneumoniae, E. coli, or P. mirablis: INDU <=16 is sensitive and INDU >=32 is resistant. This also predicts results for oral agents cefaclor, cefdinir, cefpodoxime, cefprozil, cefuroxime axetil, cephalexin and locarbef for uncomplicated UTI. Note that some isolates may be susceptible to these agents while testing resistant to cefazolin.    -  Cefepime: S <=2    -  Cefoxitin: S <=4    -  Ceftriaxone: S <=1 Enterobacter, Citrobacter, and Serratia may develop resistance during prolonged therapy    -  Ciprofloxacin: S <=0.5    -  Ertapenem: S <=0.5    -  Gentamicin: S 2    -  Imipenem: S <=1    -  Levofloxacin: S <=1    -  Meropenem: S <=1    -  Nitrofurantoin: S <=32 Should not be used to treat pyelonephritis    -  Piperacillin/Tazobactam: S <=8    -  Tigecycline: S <=1    -  Tobramycin: S <=2    -  Trimethoprim/Sulfamethoxazole: R >38    Specimen Source: .Urine Clean Catch (Midstream)    Culture Results:   >100,000 CFU/ml Escherichia coli    Organism Identification: Escherichia coli    Organism: Escherichia coli    Method Type: INDU      RADIOLOGY & ADDITIONAL TESTS:    < from: US Kidney and Bladder (18 @ 09:41) >  IMPRESSION:     Moderate bilateral hydroureteronephrosis and markedly distended urinary   bladder.      < end of copied text >    < from: CT Cervical Spine No Cont (18 @ 22:49) >  Head:  Parenchymal volume loss is noted with prominent ventricles andsulci.   Chronic microvascular ischemic changes are identified. No acute   territorial infarct is demonstrated.    There is no evidence of an acute hemorrhage or mass-effect in the   posterior fossa or in the supratentorial region.     Evaluation of the osseous structures with the appropriate window appears   unremarkable. Vascular calcifications are noted. Sequela of right lens   surgery is seen.    Cervical spine:  Bones: Straightening of the normal cervical lordosis is seen which is   likely due to muscle spasm versus patient positioning. Grade 1   anterolisthesis of C7 on T1 and T1 on T2 are noted. The cervical   vertebral body heights are maintained. No fracture is demonstrated. Disc   space narrowing and marginal osteophyte formation isseen throughout the   cervical and upper thoracic spine. Multilevel posterior disc osteophyte   complexes are seen without evidence of severe spinal canal stenosis.   Multilevel neural foraminal stenosis is noted.    Soft tissues: The prevertebral soft tissues are unremarkable. The thyroid   is unremarkable.    Lung apices: A right pleural effusion is noted.       IMPRESSION:   1. No acute territorial infarct, hemorrhage, mass effect or calvarial   fracture.  2. Multilevel degenerative changes ofthe cervical spine without evidence   of a fracture.      < end of copied text >    < from: Xray Chest 1 View- PORTABLE-Urgent (18 @ 23:01) >    Subtle airspace opacity at the right lower lobe cannot be excluded. No   definite pleural effusions. Minimal fibrotic change at the left   costophrenic angle is unchanged  .      Cardiomediastinal silhouette is intact.    < end of copied text >    MEDICATIONS  (STANDING):  aspirin  chewable 81 milliGRAM(s) Oral daily  atorvastatin 40 milliGRAM(s) Oral at bedtime  busPIRone 10 milliGRAM(s) Oral two times a day  donepezil 5 milliGRAM(s) Oral at bedtime  finasteride 5 milliGRAM(s) Oral daily  lisinopril 2.5 milliGRAM(s) Oral at bedtime  melatonin 5 milliGRAM(s) Oral at bedtime  metoprolol succinate ER 25 milliGRAM(s) Oral daily  sodium chloride 0.9%. 1000 milliLiter(s) (50 mL/Hr) IV Continuous <Continuous>  tamsulosin 0.4 milliGRAM(s) Oral at bedtime  traZODone 100 milliGRAM(s) Oral at bedtime    MEDICATIONS  (PRN):  acetaminophen   Tablet .. 650 milliGRAM(s) Oral every 6 hours PRN Temp greater or equal to 38C (100.4F), Mild Pain (1 - 3)  naphazoline/pheniramine Solution 1 Drop(s) Both EYES four times a day PRN Dry and itchy eyes  nitroglycerin     SubLingual 0.4 milliGRAM(s) SubLingual every 5 minutes PRN Chest Pain

## 2018-11-12 LAB
ANION GAP SERPL CALC-SCNC: 7 MMOL/L — SIGNIFICANT CHANGE UP (ref 5–17)
BUN SERPL-MCNC: 22 MG/DL — SIGNIFICANT CHANGE UP (ref 7–23)
CALCIUM SERPL-MCNC: 7.8 MG/DL — LOW (ref 8.5–10.1)
CHLORIDE SERPL-SCNC: 107 MMOL/L — SIGNIFICANT CHANGE UP (ref 96–108)
CO2 SERPL-SCNC: 25 MMOL/L — SIGNIFICANT CHANGE UP (ref 22–31)
CREAT SERPL-MCNC: 0.77 MG/DL — SIGNIFICANT CHANGE UP (ref 0.5–1.3)
GLUCOSE SERPL-MCNC: 111 MG/DL — HIGH (ref 70–99)
HCT VFR BLD CALC: 32.8 % — LOW (ref 39–50)
HGB BLD-MCNC: 11 G/DL — LOW (ref 13–17)
MCHC RBC-ENTMCNC: 29.6 PG — SIGNIFICANT CHANGE UP (ref 27–34)
MCHC RBC-ENTMCNC: 33.5 GM/DL — SIGNIFICANT CHANGE UP (ref 32–36)
MCV RBC AUTO: 88.4 FL — SIGNIFICANT CHANGE UP (ref 80–100)
NRBC # BLD: 0 /100 WBCS — SIGNIFICANT CHANGE UP (ref 0–0)
PLATELET # BLD AUTO: 237 K/UL — SIGNIFICANT CHANGE UP (ref 150–400)
POTASSIUM SERPL-MCNC: 3.8 MMOL/L — SIGNIFICANT CHANGE UP (ref 3.5–5.3)
POTASSIUM SERPL-SCNC: 3.8 MMOL/L — SIGNIFICANT CHANGE UP (ref 3.5–5.3)
RBC # BLD: 3.71 M/UL — LOW (ref 4.2–5.8)
RBC # FLD: 14.2 % — SIGNIFICANT CHANGE UP (ref 10.3–14.5)
SODIUM SERPL-SCNC: 139 MMOL/L — SIGNIFICANT CHANGE UP (ref 135–145)
WBC # BLD: 8.06 K/UL — SIGNIFICANT CHANGE UP (ref 3.8–10.5)
WBC # FLD AUTO: 8.06 K/UL — SIGNIFICANT CHANGE UP (ref 3.8–10.5)

## 2018-11-12 RX ADMIN — FINASTERIDE 5 MILLIGRAM(S): 5 TABLET, FILM COATED ORAL at 13:26

## 2018-11-12 RX ADMIN — Medication 81 MILLIGRAM(S): at 13:26

## 2018-11-12 RX ADMIN — ATORVASTATIN CALCIUM 40 MILLIGRAM(S): 80 TABLET, FILM COATED ORAL at 22:04

## 2018-11-12 RX ADMIN — DONEPEZIL HYDROCHLORIDE 5 MILLIGRAM(S): 10 TABLET, FILM COATED ORAL at 22:04

## 2018-11-12 RX ADMIN — TAMSULOSIN HYDROCHLORIDE 0.4 MILLIGRAM(S): 0.4 CAPSULE ORAL at 22:04

## 2018-11-12 RX ADMIN — Medication 10 MILLIGRAM(S): at 05:34

## 2018-11-12 RX ADMIN — CEFTRIAXONE 1000 MILLIGRAM(S): 500 INJECTION, POWDER, FOR SOLUTION INTRAMUSCULAR; INTRAVENOUS at 13:26

## 2018-11-12 RX ADMIN — LISINOPRIL 2.5 MILLIGRAM(S): 2.5 TABLET ORAL at 22:04

## 2018-11-12 RX ADMIN — Medication 5 MILLIGRAM(S): at 22:04

## 2018-11-12 RX ADMIN — Medication 25 MILLIGRAM(S): at 05:34

## 2018-11-12 RX ADMIN — Medication 10 MILLIGRAM(S): at 17:53

## 2018-11-12 NOTE — PROGRESS NOTE ADULT - ASSESSMENT
· Assessment		  Fall w/ injury likely syncope vs ACS vs mechanical fall  Elevated troponins due to NSTEMI   - r/o CVA - CTH negative  - troponins elevated   - ~deep TWI on initial ECG in v1-v3; decrease in depth noted with f/u am ECG  - chest pain free  - asa//BB/ statin ordered  - plavix stopped due to ongoing hematuria  - 2 d echo - severe AS , EF 35%  - tele - some PVC   - cardiac consult  - not candidate for interventions  - pain mgmt with tylenol  - s/p 9 sutures in ED  - routine wound care to superficial BLE abrasions  * UTI due to E coli  - start ceftriaxone   - urine cx noted   * AS   - 2 d echo   - cardiology consult - not an candidate for interventions  Essential HTN  - BP stable (101-120's systolic)  - VS per routine  - home med norvasc on hold given initiation of BB /metoprolol  - con't lisinopril 2.5mg daily  Hematuria , resolved  Urinary retention with moderate bilateral hydronephrosis s/p suprapubic catheter   - stop heparin sq, monitor  - stop plavix , will restart asa 81 - 11/11 - tolerating   - c/w finasteride, add flomax  - us kidney - noted  - urology consult - unable to insert Verduzco, will need IR placement of suprapubic catheter  - d/w Dr. Bangura cardiologist - ok to stop plavix, patient high risk but there is no absolute contraindication for emergent procedure  - Revised cardiac risk - moderate 6.6 % of MI, cardiac arrest   Dementia / Alzheimers / Anxiety / Insomnia  - assist with ADL's as needed  - con't home meds: aricept,/ buspar / rapaflo / trazodone / melatonin  BPH  - con't proscar  - monitor I & O  Advanced directives   - 15 minutes spend   - goals of care d/w pt & HCP--> DNR  HCP: ANN Leal [phone], [phone]	  PCP Mynor Nath at [facility] [phone]	  ann Poole [phone]  - SW/PT/case management consult for post discharge needs  11/12 - update ann Owens,   plan for [facility] as per HCP wishes

## 2018-11-12 NOTE — PROGRESS NOTE ADULT - SUBJECTIVE AND OBJECTIVE BOX
Subjective:  Patient is a 89y old  Male who presents with a chief complaint of fall, lacerations      HPI:  89 y/o male with a PMHx of HTN, BPH presents to the ED from [facility] s/p unwitnessed fall. Not on anticoagulants. Pt does not know why he is at the hospital. +laceration on the left 4 th finger   In the ED CTH / CT spine negative for acute findings; degenerative changes of spine;  he received 9 sutures to LUE 4th digit; troponin 0.391-->2.810-->2.950; ECG with TWI V1-v3; CXR w/ subtle airspace opacity at the right lower lobe cannot be excluded. No definite pleural effusions. Minimal fibrotic change at the left costophrenic angle is unchanged. Cardiomediastinal silhouette is intact.  Subjective: Denies CP/palpitation/SOB/HA/dizziness/abd pain/n/v/d/f/c; ECG NSR 70 with less pronounced TWI in v1-v4 than initial ECG yesterday on arrival to ED     18 - Patient seen and examined at bedside earlier today, denies cp, dyspnea, abdominal pain, afebrile   18 - pt seen and examined , denies CP, palpitations, abdominal pain, + hematuria, POC discussed at bedside  11/10 - pt seen and examined, gross hematuria from suprabupic cathether , denies pain, palpitations, tele - sinus    - pt seen and examined, denies abdominal pain, suprapubic cath drains clear urine, afebrile, eager to go to [facility]    - pt seen and examined, denies pain, catheter draining clear urine , afebrile    Review of system- Rest of the review of system are negative except mentioned in HPI    T(C): 36.3 (18 @ 11:10), Max: 36.7 (18 @ 17:05)  T(F): 97.3 (18 @ 11:10), Max: 98 (18 @ 17:05)  HR: 66 (18 @ 11:10) (66 - 80)  BP: 105/61 (18 @ 11:10) (103/58 - 128/71)  RR: 17 (18 @ 11:10) (17 - 18)  SpO2: 99% (18 @ 11:10) (94% - 99%)  Wt(kg): --  PHYSICAL EXAM:  GENERAL: NAD  NERVOUS SYSTEM:  Alert & Oriented X2, non- focal exam,  HEAD:  Atraumatic, Normocephalic  EYES: EOMI, PERRLA, conjunctiva and sclera clear  HEENT: Moist mucous membranes  NECK: Supple, No JVD  CHEST/LUNG: Clear to auscultation bilaterally; No rales, no rhonchi, no wheezing, or rubs  HEART: Regular rate and rhythm; No murmurs, rubs, or gallops  ABDOMEN: Soft, Nontender, Nondistended; Bowel sounds present  GENITOURINARY- Voiding, no suprapubic tenderness  EXTREMITIES:  2+ Peripheral Pulses, No clubbing, cyanosis, or edema  MUSCULOSKELETAL:- No muscle tenderness, Muscle tone normal, No joint tenderness, no Joint swelling, Joint range of motion-normal  SKIN-no rash, no lesion    LABS:      139  |  107  |  22  ----------------------------<  111<H>  3.8   |  25  |  0.77    Ca    7.8<L>      2018 06:15                         11.0   8.06  )-----------( 237      ( 2018 06:15 )             32.8         141  |  110<H>  |  27<H>  ----------------------------<  96  3.8   |  25  |  0.76    Ca    8.0<L>      2018 05:59                       11.0   8.51  )-----------( 225      ( 2018 05:59 )             33.1       11-10    142  |  111<H>  |  38<H>  ----------------------------<  99  3.9   |  24  |  0.67    Ca    8.2<L>      10 Nov 2018 06:35  Mg     2.1                             10.9   7.87  )-----------( 222      ( 10 Nov 2018 06:35 )             32.9     CARDIAC MARKERS ( 2018 06:07 )  0.787 ng/mL / x     / x     / x     / x              142  |  109<H>  |  45<H>  ----------------------------<  110<H>  4.1   |  26  |  0.92    Ca    8.6      2018 06:07  Mg     2.1         TPro  5.7<L>  /  Alb  2.7<L>  /  TBili  0.6  /  DBili  x   /  AST  16  /  ALT  17  /  AlkPhos  64                          11.8   7.89  )-----------( 245      ( 2018 06:07 )             36.5       CARDIAC MARKERS ( 2018 06:07 )  0.787 ng/mL / x     / x     / x     / x      CARDIAC MARKERS ( 2018 17:48 )  2.390 ng/mL / x     / 73 U/L / x     / x        LIVER FUNCTIONS - ( 2018 06:27 )  Alb: 2.7 g/dL / Pro: 5.7 gm/dL / ALK PHOS: 64 U/L / ALT: 17 U/L / AST: 16 U/L / GGT: x                 Urinalysis Basic - ( 2018 13:10 )    Color: Brown / Appearance: very cloudy / S.015 / pH: x  Gluc: x / Ketone: Trace  / Bili: Negative / Urobili: Negative mg/dL   Blood: x / Protein: 100 mg/dL / Nitrite: Negative   Leuk Esterase: Moderate / RBC: 25-50 /HPF / WBC 26-50   Sq Epi: x / Non Sq Epi: Few / Bacteria: Many                            11.6   10.18 )-----------( 243      ( 2018 06:27 )             35.5         142  |  110<H>  |  41<H>  ----------------------------<  97  4.0   |  25  |  0.87    Ca    8.0<L>      2018 06:27  Mg     2.1     07    TPro  5.7<L>  /  Alb  2.7<L>  /  TBili  0.6  /  DBili  x   /  AST  16  /  ALT  17  /  AlkPhos  64  11-08    PT/INR - ( 2018 21:30 )   PT: 10.0 sec;   INR: 0.90 ratio         PTT - ( 2018 21:30 )  PTT:28.4 sec  CARDIAC MARKERS ( 2018 17:48 )  2.390 ng/mL / x     / 73 U/L / x     / x      CARDIAC MARKERS ( 2018 10:52 )  2.950 ng/mL / x     / 72 U/L / x     / x      CARDIAC MARKERS ( 2018 05:49 )  2.810 ng/mL / x     / x     / x     / x      CARDIAC MARKERS ( 2018 21:30 )  0.391 ng/mL / x     / x     / x     / x              CAPILLARY BLOOD GLUCOSE            RECENT CULTURES:  Culture - Urine (18 @ 13:10)    -  Amikacin: S <=8    -  Amoxicillin/Clavulanic Acid: S <=8/4    -  Ampicillin: R >16 These ampicillin results predict results for amoxicillin    -  Ampicillin/Sulbactam: S 8/4    -  Aztreonam: S <=4    -  Cefazolin: S <=2 For uncomplicated UTI with K. pneumoniae, E. coli, or P. mirablis: INDU <=16 is sensitive and INDU >=32 is resistant. This also predicts results for oral agents cefaclor, cefdinir, cefpodoxime, cefprozil, cefuroxime axetil, cephalexin and locarbef for uncomplicated UTI. Note that some isolates may be susceptible to these agents while testing resistant to cefazolin.    -  Cefepime: S <=2    -  Cefoxitin: S <=4    -  Ceftriaxone: S <=1 Enterobacter, Citrobacter, and Serratia may develop resistance during prolonged therapy    -  Ciprofloxacin: S <=0.5    -  Ertapenem: S <=0.5    -  Gentamicin: S 2    -  Imipenem: S <=1    -  Levofloxacin: S <=1    -  Meropenem: S <=1    -  Nitrofurantoin: S <=32 Should not be used to treat pyelonephritis    -  Piperacillin/Tazobactam: S <=8    -  Tigecycline: S <=1    -  Tobramycin: S <=2    -  Trimethoprim/Sulfamethoxazole: R >    Specimen Source: .Urine Clean Catch (Midstream)    Culture Results:   >100,000 CFU/ml Escherichia coli    Organism Identification: Escherichia coli    Organism: Escherichia coli    Method Type: INDU      RADIOLOGY & ADDITIONAL TESTS:    < from: US Kidney and Bladder (18 @ 09:41) >  IMPRESSION:     Moderate bilateral hydroureteronephrosis and markedly distended urinary   bladder.      < end of copied text >    < from: CT Cervical Spine No Cont (18 @ 22:49) >  Head:  Parenchymal volume loss is noted with prominent ventricles andsulci.   Chronic microvascular ischemic changes are identified. No acute   territorial infarct is demonstrated.    There is no evidence of an acute hemorrhage or mass-effect in the   posterior fossa or in the supratentorial region.     Evaluation of the osseous structures with the appropriate window appears   unremarkable. Vascular calcifications are noted. Sequela of right lens   surgery is seen.    Cervical spine:  Bones: Straightening of the normal cervical lordosis is seen which is   likely due to muscle spasm versus patient positioning. Grade 1   anterolisthesis of C7 on T1 and T1 on T2 are noted. The cervical   vertebral body heights are maintained. No fracture is demonstrated. Disc   space narrowing and marginal osteophyte formation isseen throughout the   cervical and upper thoracic spine. Multilevel posterior disc osteophyte   complexes are seen without evidence of severe spinal canal stenosis.   Multilevel neural foraminal stenosis is noted.    Soft tissues: The prevertebral soft tissues are unremarkable. The thyroid   is unremarkable.    Lung apices: A right pleural effusion is noted.       IMPRESSION:   1. No acute territorial infarct, hemorrhage, mass effect or calvarial   fracture.  2. Multilevel degenerative changes ofthe cervical spine without evidence   of a fracture.      < end of copied text >    < from: Xray Chest 1 View- PORTABLE-Urgent (18 @ 23:01) >    Subtle airspace opacity at the right lower lobe cannot be excluded. No   definite pleural effusions. Minimal fibrotic change at the left   costophrenic angle is unchanged  .      Cardiomediastinal silhouette is intact.    < end of copied text >    MEDICATIONS  (STANDING):  aspirin  chewable 81 milliGRAM(s) Oral daily  atorvastatin 40 milliGRAM(s) Oral at bedtime  busPIRone 10 milliGRAM(s) Oral two times a day  donepezil 5 milliGRAM(s) Oral at bedtime  finasteride 5 milliGRAM(s) Oral daily  lisinopril 2.5 milliGRAM(s) Oral at bedtime  melatonin 5 milliGRAM(s) Oral at bedtime  metoprolol succinate ER 25 milliGRAM(s) Oral daily  sodium chloride 0.9%. 1000 milliLiter(s) (50 mL/Hr) IV Continuous <Continuous>  tamsulosin 0.4 milliGRAM(s) Oral at bedtime  traZODone 100 milliGRAM(s) Oral at bedtime    MEDICATIONS  (PRN):  acetaminophen   Tablet .. 650 milliGRAM(s) Oral every 6 hours PRN Temp greater or equal to 38C (100.4F), Mild Pain (1 - 3)  naphazoline/pheniramine Solution 1 Drop(s) Both EYES four times a day PRN Dry and itchy eyes  nitroglycerin     SubLingual 0.4 milliGRAM(s) SubLingual every 5 minutes PRN Chest Pain

## 2018-11-13 ENCOUNTER — TRANSCRIPTION ENCOUNTER (OUTPATIENT)
Age: 83
End: 2018-11-13

## 2018-11-13 LAB
HCT VFR BLD CALC: 35.1 % — LOW (ref 39–50)
HGB BLD-MCNC: 11.7 G/DL — LOW (ref 13–17)

## 2018-11-13 RX ORDER — TRAZODONE HCL 50 MG
1 TABLET ORAL
Qty: 0 | Refills: 0 | COMMUNITY

## 2018-11-13 RX ORDER — AMLODIPINE BESYLATE 2.5 MG/1
1 TABLET ORAL
Qty: 0 | Refills: 0 | COMMUNITY

## 2018-11-13 RX ORDER — QUETIAPINE FUMARATE 200 MG/1
12.5 TABLET, FILM COATED ORAL EVERY 8 HOURS
Qty: 0 | Refills: 0 | Status: DISCONTINUED | OUTPATIENT
Start: 2018-11-13 | End: 2018-11-15

## 2018-11-13 RX ORDER — CEFUROXIME AXETIL 250 MG
500 TABLET ORAL EVERY 12 HOURS
Qty: 0 | Refills: 0 | Status: DISCONTINUED | OUTPATIENT
Start: 2018-11-13 | End: 2018-11-15

## 2018-11-13 RX ORDER — CEFUROXIME AXETIL 250 MG
1 TABLET ORAL
Qty: 0 | Refills: 0 | COMMUNITY
Start: 2018-11-13 | End: 2018-11-17

## 2018-11-13 RX ORDER — GLYCERIN 1 %
1 DROPS OPHTHALMIC (EYE)
Qty: 0 | Refills: 0 | COMMUNITY
Start: 2018-11-13

## 2018-11-13 RX ORDER — QUETIAPINE FUMARATE 200 MG/1
12.5 TABLET, FILM COATED ORAL
Qty: 0 | Refills: 0 | COMMUNITY
Start: 2018-11-13

## 2018-11-13 RX ORDER — TRAZODONE HCL 50 MG
1 TABLET ORAL
Qty: 0 | Refills: 0 | COMMUNITY
Start: 2018-11-13

## 2018-11-13 RX ORDER — SILODOSIN 4 MG/1
1 CAPSULE ORAL
Qty: 0 | Refills: 0 | COMMUNITY

## 2018-11-13 RX ORDER — ASPIRIN/CALCIUM CARB/MAGNESIUM 324 MG
1 TABLET ORAL
Qty: 0 | Refills: 0 | COMMUNITY
Start: 2018-11-13

## 2018-11-13 RX ORDER — TAMSULOSIN HYDROCHLORIDE 0.4 MG/1
1 CAPSULE ORAL
Qty: 0 | Refills: 0 | COMMUNITY
Start: 2018-11-13

## 2018-11-13 RX ORDER — TRAZODONE HCL 50 MG
50 TABLET ORAL AT BEDTIME
Qty: 0 | Refills: 0 | Status: DISCONTINUED | OUTPATIENT
Start: 2018-11-13 | End: 2018-11-15

## 2018-11-13 RX ORDER — METOPROLOL TARTRATE 50 MG
1 TABLET ORAL
Qty: 0 | Refills: 0 | COMMUNITY
Start: 2018-11-13

## 2018-11-13 RX ORDER — ATORVASTATIN CALCIUM 80 MG/1
1 TABLET, FILM COATED ORAL
Qty: 0 | Refills: 0 | COMMUNITY
Start: 2018-11-13

## 2018-11-13 RX ADMIN — QUETIAPINE FUMARATE 12.5 MILLIGRAM(S): 200 TABLET, FILM COATED ORAL at 21:55

## 2018-11-13 RX ADMIN — CEFTRIAXONE 1000 MILLIGRAM(S): 500 INJECTION, POWDER, FOR SOLUTION INTRAMUSCULAR; INTRAVENOUS at 11:42

## 2018-11-13 RX ADMIN — Medication 5 MILLIGRAM(S): at 21:51

## 2018-11-13 RX ADMIN — Medication 50 MILLIGRAM(S): at 21:50

## 2018-11-13 RX ADMIN — FINASTERIDE 5 MILLIGRAM(S): 5 TABLET, FILM COATED ORAL at 11:43

## 2018-11-13 RX ADMIN — TAMSULOSIN HYDROCHLORIDE 0.4 MILLIGRAM(S): 0.4 CAPSULE ORAL at 21:50

## 2018-11-13 RX ADMIN — Medication 25 MILLIGRAM(S): at 05:27

## 2018-11-13 RX ADMIN — Medication 10 MILLIGRAM(S): at 05:27

## 2018-11-13 RX ADMIN — Medication 650 MILLIGRAM(S): at 13:51

## 2018-11-13 RX ADMIN — QUETIAPINE FUMARATE 12.5 MILLIGRAM(S): 200 TABLET, FILM COATED ORAL at 13:52

## 2018-11-13 RX ADMIN — Medication 10 MILLIGRAM(S): at 21:50

## 2018-11-13 RX ADMIN — ATORVASTATIN CALCIUM 40 MILLIGRAM(S): 80 TABLET, FILM COATED ORAL at 21:50

## 2018-11-13 RX ADMIN — Medication 81 MILLIGRAM(S): at 11:43

## 2018-11-13 RX ADMIN — DONEPEZIL HYDROCHLORIDE 5 MILLIGRAM(S): 10 TABLET, FILM COATED ORAL at 21:54

## 2018-11-13 RX ADMIN — Medication 500 MILLIGRAM(S): at 18:56

## 2018-11-13 NOTE — DISCHARGE NOTE ADULT - PATIENT PORTAL LINK FT
You can access the [facility] Patient Portal, offered by [facility], by registering with the following website: [email or web address]

## 2018-11-13 NOTE — PROGRESS NOTE ADULT - ASSESSMENT
· Assessment		  Fall w/ injury likely syncope vs ACS vs mechanical fall  Elevated troponins due to NSTEMI   - r/o CVA - CTH negative  - troponins elevated   - ~deep TWI on initial ECG in v1-v3; decrease in depth noted with f/u am ECG  - chest pain free  - asa//BB/ statin ordered  - plavix stopped due to ongoing hematuria  - 2 d echo - severe AS , EF 35%  - tele - some PVC   - cardiac consult  - not candidate for interventions  - pain mgmt with tylenol  - s/p 9 sutures in ED  - routine wound care to superficial BLE abrasions  * UTI due to E coli  - start ceftriaxone --> ceftin 500 bid for 4 more days   - urine cx noted   * AS   - 2 d echo   - cardiology consult - not an candidate for interventions  Essential HTN  - BP stable (101-120's systolic)  - VS per routine  - home med norvasc on hold given initiation of BB /metoprolol  - con't lisinopril 2.5mg daily  Hematuria , resolved  Urinary retention with moderate bilateral hydronephrosis s/p suprapubic catheter   - stop heparin sq, monitor  - stop plavix , will restart asa 81 - 11/11 - tolerating   - c/w finasteride, add flomax  - us kidney - noted  - urology consult - unable to insert Verduzco, will need IR placement of suprapubic catheter  - d/w Dr. Bangura cardiologist - ok to stop plavix, patient high risk but there is no absolute contraindication for emergent procedure  - Revised cardiac risk - moderate 6.6 % of MI, cardiac arrest   Dementia / Alzheimers / Anxiety / Insomnia  - assist with ADL's as needed  - con't home meds: aricept,/ buspar / rapaflo / trazodone / melatonin  BPH  - con't proscar  - monitor I & O  Agitation suspected underlying dementia with fluctuating mental status  - supportive care  - seroquel prn   - OOB in the chair daily  - address pain with tylenol before giving the seroquel  Advanced directives   - 15 minutes spend   - goals of care d/w pt & HCP--> DNR  HCP: GISSELLHELENE Quintana Elvis [phone], [phone]	  PCP Mynor Nath at [facility] [phone]	  ann Poole [phone]  - /PT/case management consult for post discharge needs  11/12 - update ann Owens,   [facility] [place] as per HCP wishes   prior authorization pending

## 2018-11-13 NOTE — DISCHARGE NOTE ADULT - OTHER SIGNIFICANT FINDINGS
Complete Blood Count in AM (11.12.18 @ 06:15)    Nucleated RBC: 0 /100 WBCs    WBC Count: 8.06 K/uL    RBC Count: 3.71 M/uL    Hemoglobin: 11.0 g/dL    Hematocrit: 32.8 %    Mean Cell Volume: 88.4 fl    Mean Cell Hemoglobin: 29.6 pg    Mean Cell Hemoglobin Conc: 33.5 gm/dL    Red Cell Distrib Width: 14.2 %    Platelet Count - Automated: 237 K/uL    Basic Metabolic Panel in AM (11.12.18 @ 06:15)    Sodium, Serum: 139 mmol/L    Potassium, Serum: 3.8 mmol/L    Chloride, Serum: 107 mmol/L    Carbon Dioxide, Serum: 25 mmol/L    Anion Gap, Serum: 7 mmol/L    Blood Urea Nitrogen, Serum: 22 mg/dL    Creatinine, Serum: 0.77 mg/dL    Glucose, Serum: 111 mg/dL    Calcium, Total Serum: 7.8 mg/dL    < from: IR Procedure (11.09.18 @ 18:34) >  IMPRESSION:    Successful percutaneous placement of a 10 Filipino locking Resolve drainage   catheter into the bladder as a suprapubic catheter.      < end of copied text >  < from: Transthoracic Echocardiogram (11.07.18 @ 11:26) >   Endocardium is not always well visualized, however, overall left   ventricular systolic function appears decreased. There is a large area of   apical akinesis.   Estimated left ventricular ejection fraction is 35-40 %.   The left atrium is mildly dilated.   Normal appearing right atrium.   Normal appearing right ventricle structure and function.   Significant fibrocalcific changes noted to the aortic valve leaflets with   restriction in leaflet excursion. Peak transaortic gradient is 34 mmHg;   however, the calculated NOEMI is .94cm/2 this finding is consistent with   severe aortic stenosis.   Fibrocalcific changes noted to the mitral valve leaflets with preserved   leaflet excursion.   Mild mitral annular calcification is present.   Mild (1+) mitral regurgitation is present.   EA reversal of the mitral inflow consistent with reduced compliance of   the   left ventricle   Normal appearing tricuspid valve structure and function.   Mild (1+) tricuspid valve regurgitation is present.   A small, non hemodynamically significant effusion is present.    < end of copied text >    < from: US Kidney and Bladder (11.09.18 @ 09:41) >  IMPRESSION:     Moderate bilateral hydroureteronephrosis and markedly distended urinary   bladder.    < end of copied text >

## 2018-11-13 NOTE — DISCHARGE NOTE ADULT - MEDICATION SUMMARY - MEDICATIONS TO STOP TAKING
I will STOP taking the medications listed below when I get home from the hospital:    Rapaflo 8 mg oral capsule  -- 1 cap(s) by mouth once a day

## 2018-11-13 NOTE — DISCHARGE NOTE ADULT - CARE PROVIDERS DIRECT ADDRESSES
,DirectAddress_Unknown,[email or web address],DirectAddress_Unknown

## 2018-11-13 NOTE — DISCHARGE NOTE ADULT - MEDICATION SUMMARY - MEDICATIONS TO CHANGE
I will SWITCH the dose or number of times a day I take the medications listed below when I get home from the hospital:    traZODone 100 mg oral tablet  -- 1 tab(s) by mouth once a day (at bedtime)

## 2018-11-13 NOTE — DISCHARGE NOTE ADULT - PROVIDER TOKENS
FREE:[LAST:[nia],FIRST:[Mynor],PHONE:[(   )    -],FAX:[(   )    -]],TOKEN:'[id number]:'[id number]'

## 2018-11-13 NOTE — DISCHARGE NOTE ADULT - CARE PROVIDER_API CALL
Mynor kramer  Phone: (   )    -  Fax: (   )    -    Palla, Venugopal R (MD), Cardiovascular Disease; Internal Medicine  [address] [place] [place], [place] [id number]  Phone: [phone]  Fax: [phone]    Geoffrey Soares), Urology  [address] [place] [address]  Phone: [phone]  Fax: [phone]

## 2018-11-13 NOTE — DISCHARGE NOTE ADULT - CARE PLAN
Principal Discharge DX:	Syncope and collapse  Goal:	prevent recurrence  Assessment and plan of treatment:	PT, supportive care, fall precautions, minimize polypharmacy  finger laceration s/p repair in ed  Secondary Diagnosis:	NSTEMI, initial episode of care  Assessment and plan of treatment:	continue with ASA 81, statins, acei, not on plavix due ot hematuria  Secondary Diagnosis:	Aortic stenosis, severe  Assessment and plan of treatment:	conservative management  Secondary Diagnosis:	HTN (hypertension)  Assessment and plan of treatment:	continue current medications  Secondary Diagnosis:	Urinary retention  Assessment and plan of treatment:	s/p suprapubic catheter , follow up with urology wiin 1-2 weeks  Secondary Diagnosis:	Dementia  Assessment and plan of treatment:	supportive care, use low dose seroquel prn for agitations  Secondary Diagnosis:	UTI (urinary tract infection)  Assessment and plan of treatment:	complete 4 more days of ceftin

## 2018-11-13 NOTE — DISCHARGE NOTE ADULT - SECONDARY DIAGNOSIS.
NSTEMI, initial episode of care Aortic stenosis, severe HTN (hypertension) Urinary retention Dementia UTI (urinary tract infection)

## 2018-11-13 NOTE — DISCHARGE NOTE ADULT - MEDICATION SUMMARY - MEDICATIONS TO TAKE
I will START or STAY ON the medications listed below when I get home from the hospital:    Proscar 5 mg oral tablet  -- 1 tab(s) by mouth once a day  -- Indication: For Home meds    Tylenol 325 mg oral tablet  -- 2 tab(s) by mouth every 4 hours, As Needed  -- Indication: For pain    aspirin 81 mg oral tablet, chewable  -- 1 tab(s) by mouth once a day  -- Indication: For NSTEMI, initial episode of care    lisinopril 2.5 mg oral tablet  -- 1 tab(s) by mouth once a day  -- Indication: For NSTEMI, initial episode of care    tamsulosin 0.4 mg oral capsule  -- 1 cap(s) by mouth once a day (at bedtime)  -- Indication: For Urinary retention    traZODone 50 mg oral tablet  -- 1 tab(s) by mouth once a day (at bedtime)  -- Indication: For Home meds    atorvastatin 40 mg oral tablet  -- 1 tab(s) by mouth once a day (at bedtime)  -- Indication: For NSTEMI, initial episode of care    QUEtiapine  -- 12.5 milligram(s) by mouth every 8 hours, As Needed for agitation  -- Indication: For Agitation    BuSpar 10 mg oral tablet  -- 1 tab(s) by mouth 2 times a day  -- Indication: For Home meds    metoprolol succinate 25 mg oral tablet, extended release  -- 1 tab(s) by mouth once a day  -- Indication: For NSTEMI, initial episode of care    cefuroxime 500 mg oral tablet  -- 1 tab(s) by mouth every 12 hours for 4 days for UTI  -- Indication: For UTI    Aricept 5 mg oral tablet  -- 1 tab(s) by mouth once a day (at bedtime)  -- Indication: For Home meds    Melatonin 5 mg oral tablet  -- 1 tab(s) by mouth once a day (at bedtime)  -- Indication: For insomnia    naphazoline-pheniramine 0.025%-0.3% ophthalmic solution  -- 1 drop(s) to each affected eye 4 times a day, As needed, Dry and itchy eyes  -- Indication: For dry eyes

## 2018-11-13 NOTE — PROGRESS NOTE ADULT - SUBJECTIVE AND OBJECTIVE BOX
Subjective:  Patient is a 89y old  Male who presents with a chief complaint of fall, lacerations      HPI:  89 y/o male with a PMHx of HTN, BPH presents to the ED from [facility] s/p unwitnessed fall. Not on anticoagulants. Pt does not know why he is at the hospital. +laceration on the left 4 th finger   In the ED CTH / CT spine negative for acute findings; degenerative changes of spine;  he received 9 sutures to LUE 4th digit; troponin 0.391-->2.810-->2.950; ECG with TWI V1-v3; CXR w/ subtle airspace opacity at the right lower lobe cannot be excluded. No definite pleural effusions. Minimal fibrotic change at the left costophrenic angle is unchanged. Cardiomediastinal silhouette is intact.  Subjective: Denies CP/palpitation/SOB/HA/dizziness/abd pain/n/v/d/f/c; ECG NSR 70 with less pronounced TWI in v1-v4 than initial ECG yesterday on arrival to ED     18 - Patient seen and examined at bedside earlier today, denies cp, dyspnea, abdominal pain, afebrile   18 - pt seen and examined , denies CP, palpitations, abdominal pain, + hematuria, POC discussed at bedside  11/10 - pt seen and examined, gross hematuria from suprabupic cathether , denies pain, palpitations, tele - sinus    - pt seen and examined, denies abdominal pain, suprapubic cath drains clear urine, afebrile, eager to go to [facility]    - pt seen and examined, denies pain, catheter draining clear urine , afebrile   - pt seen and examined , agitation overnight, awaiting rhehab placement  Review of system- Rest of the review of system are negative except mentioned in HPI    T(C): 36.8 (18 @ 10:17), Max: 36.8 (18 @ 10:17)  T(F): 98.2 (18 @ 10:17), Max: 98.2 (18 @ 10:17)  HR: 85 (18 @ 10:17) (79 - 85)  BP: 121/66 (18 @ 10:17) (120/61 - 121/66)  RR: 18 (18 @ 10:17) (18 - 18)  SpO2: 95% (18 @ 10:17) (95% - 98%)  Wt(kg): --  PHYSICAL EXAM:  GENERAL: NAD  NERVOUS SYSTEM:  Alert & Oriented X2, non- focal exam,  HEAD:  Atraumatic, Normocephalic  EYES: EOMI, PERRLA, conjunctiva and sclera clear  HEENT: Moist mucous membranes  NECK: Supple, No JVD  CHEST/LUNG: Clear to auscultation bilaterally; No rales, no rhonchi, no wheezing, or rubs  HEART: Regular rate and rhythm; No murmurs, rubs, or gallops  ABDOMEN: Soft, Nontender, Nondistended; Bowel sounds present  GENITOURINARY- Voiding, no suprapubic tenderness  EXTREMITIES:  2+ Peripheral Pulses, No clubbing, cyanosis, or edema  MUSCULOSKELETAL:- No muscle tenderness, Muscle tone normal, No joint tenderness, no Joint swelling, Joint range of motion-normal  SKIN-no rash, no lesion    LABS:        139  |  107  |  22  ----------------------------<  111<H>  3.8   |  25  |  0.77    Ca    7.8<L>      2018 06:15                         11.0   8.06  )-----------( 237      ( 2018 06:15 )             32.8         141  |  110<H>  |  27<H>  ----------------------------<  96  3.8   |  25  |  0.76    Ca    8.0<L>      2018 05:59                       11.0   8.51  )-----------( 225      ( 2018 05:59 )             33.1       11-10    142  |  111<H>  |  38<H>  ----------------------------<  99  3.9   |  24  |  0.67    Ca    8.2<L>      10 Nov 2018 06:35  Mg     2.1                             10.9   7.87  )-----------( 222      ( 10 Nov 2018 06:35 )             32.9     CARDIAC MARKERS ( 2018 06:07 )  0.787 ng/mL / x     / x     / x     / x              142  |  109<H>  |  45<H>  ----------------------------<  110<H>  4.1   |  26  |  0.92    Ca    8.6      2018 06:07  Mg     2.1         TPro  5.7<L>  /  Alb  2.7<L>  /  TBili  0.6  /  DBili  x   /  AST  16  /  ALT  17  /  AlkPhos  64                          11.8   7.89  )-----------( 245      ( 2018 06:07 )             36.5       CARDIAC MARKERS ( 2018 06:07 )  0.787 ng/mL / x     / x     / x     / x      CARDIAC MARKERS ( 2018 17:48 )  2.390 ng/mL / x     / 73 U/L / x     / x        LIVER FUNCTIONS - ( 2018 06:27 )  Alb: 2.7 g/dL / Pro: 5.7 gm/dL / ALK PHOS: 64 U/L / ALT: 17 U/L / AST: 16 U/L / GGT: x                 Urinalysis Basic - ( 2018 13:10 )    Color: Brown / Appearance: very cloudy / S.015 / pH: x  Gluc: x / Ketone: Trace  / Bili: Negative / Urobili: Negative mg/dL   Blood: x / Protein: 100 mg/dL / Nitrite: Negative   Leuk Esterase: Moderate / RBC: 25-50 /HPF / WBC 26-50   Sq Epi: x / Non Sq Epi: Few / Bacteria: Many                            11.6   10.18 )-----------( 243      ( 2018 06:27 )             35.5         142  |  110<H>  |  41<H>  ----------------------------<  97  4.0   |  25  |  0.87    Ca    8.0<L>      2018 06:27  Mg     2.1     11    TPro  5.7<L>  /  Alb  2.7<L>  /  TBili  0.6  /  DBili  x   /  AST  16  /  ALT  17  /  AlkPhos  64  11-08    PT/INR - ( 2018 21:30 )   PT: 10.0 sec;   INR: 0.90 ratio         PTT - ( 2018 21:30 )  PTT:28.4 sec  CARDIAC MARKERS ( 2018 17:48 )  2.390 ng/mL / x     / 73 U/L / x     / x      CARDIAC MARKERS ( 2018 10:52 )  2.950 ng/mL / x     / 72 U/L / x     / x      CARDIAC MARKERS ( 2018 05:49 )  2.810 ng/mL / x     / x     / x     / x      CARDIAC MARKERS ( 2018 21:30 )  0.391 ng/mL / x     / x     / x     / x              CAPILLARY BLOOD GLUCOSE            RECENT CULTURES:  Culture - Urine (18 @ 13:10)    -  Amikacin: S <=8    -  Amoxicillin/Clavulanic Acid: S <=8/4    -  Ampicillin: R >16 These ampicillin results predict results for amoxicillin    -  Ampicillin/Sulbactam: S 8/4    -  Aztreonam: S <=4    -  Cefazolin: S <=2 For uncomplicated UTI with K. pneumoniae, E. coli, or P. mirablis: INDU <=16 is sensitive and INDU >=32 is resistant. This also predicts results for oral agents cefaclor, cefdinir, cefpodoxime, cefprozil, cefuroxime axetil, cephalexin and locarbef for uncomplicated UTI. Note that some isolates may be susceptible to these agents while testing resistant to cefazolin.    -  Cefepime: S <=2    -  Cefoxitin: S <=4    -  Ceftriaxone: S <=1 Enterobacter, Citrobacter, and Serratia may develop resistance during prolonged therapy    -  Ciprofloxacin: S <=0.5    -  Ertapenem: S <=0.5    -  Gentamicin: S 2    -  Imipenem: S <=1    -  Levofloxacin: S <=1    -  Meropenem: S <=1    -  Nitrofurantoin: S <=32 Should not be used to treat pyelonephritis    -  Piperacillin/Tazobactam: S <=8    -  Tigecycline: S <=1    -  Tobramycin: S <=2    -  Trimethoprim/Sulfamethoxazole: R >    Specimen Source: .Urine Clean Catch (Midstream)    Culture Results:   >100,000 CFU/ml Escherichia coli    Organism Identification: Escherichia coli    Organism: Escherichia coli    Method Type: INDU      RADIOLOGY & ADDITIONAL TESTS:    < from: US Kidney and Bladder (18 @ 09:41) >  IMPRESSION:     Moderate bilateral hydroureteronephrosis and markedly distended urinary   bladder.      < end of copied text >    < from: CT Cervical Spine No Cont (18 @ 22:49) >  Head:  Parenchymal volume loss is noted with prominent ventricles andsulci.   Chronic microvascular ischemic changes are identified. No acute   territorial infarct is demonstrated.    There is no evidence of an acute hemorrhage or mass-effect in the   posterior fossa or in the supratentorial region.     Evaluation of the osseous structures with the appropriate window appears   unremarkable. Vascular calcifications are noted. Sequela of right lens   surgery is seen.    Cervical spine:  Bones: Straightening of the normal cervical lordosis is seen which is   likely due to muscle spasm versus patient positioning. Grade 1   anterolisthesis of C7 on T1 and T1 on T2 are noted. The cervical   vertebral body heights are maintained. No fracture is demonstrated. Disc   space narrowing and marginal osteophyte formation isseen throughout the   cervical and upper thoracic spine. Multilevel posterior disc osteophyte   complexes are seen without evidence of severe spinal canal stenosis.   Multilevel neural foraminal stenosis is noted.    Soft tissues: The prevertebral soft tissues are unremarkable. The thyroid   is unremarkable.    Lung apices: A right pleural effusion is noted.       IMPRESSION:   1. No acute territorial infarct, hemorrhage, mass effect or calvarial   fracture.  2. Multilevel degenerative changes ofthe cervical spine without evidence   of a fracture.      < end of copied text >    < from: Xray Chest 1 View- PORTABLE-Urgent (18 @ 23:01) >    Subtle airspace opacity at the right lower lobe cannot be excluded. No   definite pleural effusions. Minimal fibrotic change at the left   costophrenic angle is unchanged  .      Cardiomediastinal silhouette is intact.    < end of copied text >    MEDICATIONS  (STANDING):  aspirin  chewable 81 milliGRAM(s) Oral daily  atorvastatin 40 milliGRAM(s) Oral at bedtime  busPIRone 10 milliGRAM(s) Oral two times a day  cefTRIAXone Injectable. 1000 milliGRAM(s) IV Push every 24 hours  cefTRIAXone Injectable.      donepezil 5 milliGRAM(s) Oral at bedtime  finasteride 5 milliGRAM(s) Oral daily  lisinopril 2.5 milliGRAM(s) Oral at bedtime  melatonin 5 milliGRAM(s) Oral at bedtime  metoprolol succinate ER 25 milliGRAM(s) Oral daily  sodium chloride 0.9%. 1000 milliLiter(s) (50 mL/Hr) IV Continuous <Continuous>  tamsulosin 0.4 milliGRAM(s) Oral at bedtime    MEDICATIONS  (PRN):  acetaminophen   Tablet .. 650 milliGRAM(s) Oral every 6 hours PRN Temp greater or equal to 38C (100.4F), Mild Pain (1 - 3)  naphazoline/pheniramine Solution 1 Drop(s) Both EYES four times a day PRN Dry and itchy eyes  nitroglycerin     SubLingual 0.4 milliGRAM(s) SubLingual every 5 minutes PRN Chest Pain  QUEtiapine 12.5 milliGRAM(s) Oral every 8 hours PRN for anxiety

## 2018-11-13 NOTE — DISCHARGE NOTE ADULT - HOSPITAL COURSE
Subjective:  Patient is a 89y old  Male who presents with a chief complaint of fall, lacerations      HPI:  89 y/o male with a PMHx of HTN, BPH presents to the ED from [facility] s/p unwitnessed fall. Not on anticoagulants. Pt does not know why he is at the hospital. +laceration on the left 4 th finger   In the ED CTH / CT spine negative for acute findings; degenerative changes of spine;  he received 9 sutures to LUE 4th digit; troponin 0.391-->2.810-->2.950; ECG with TWI V1-v3; CXR w/ subtle airspace opacity at the right lower lobe cannot be excluded. No definite pleural effusions. Minimal fibrotic change at the left costophrenic angle is unchanged. Cardiomediastinal silhouette is intact.  Subjective: Denies CP/palpitation/SOB/HA/dizziness/abd pain/n/v/d/f/c; ECG NSR 70 with less pronounced TWI in v1-v4 than initial ECG yesterday on arrival to ED     11/8/18 - Patient seen and examined at bedside earlier today, denies cp, dyspnea, abdominal pain, afebrile   11/9/18 - pt seen and examined , denies CP, palpitations, abdominal pain, + hematuria, POC discussed at bedside  11/10 - pt seen and examined, gross hematuria from suprabupic cathether , denies pain, palpitations, tele - sinus   11/11 - pt seen and examined, denies abdominal pain, suprapubic cath drains clear urine, afebrile, eager to go to [facility]   11/12 - pt seen and examined, denies pain, catheter draining clear urine , afebrile  11/13 - pt seen and examined , agitation overnight, awaiting rhehab placement  Review of system- Rest of the review of system are negative except mentioned in HPI  T(C): 36.8 (11-13-18 @ 10:17), Max: 36.8 (11-13-18 @ 10:17)  T(F): 98.2 (11-13-18 @ 10:17), Max: 98.2 (11-13-18 @ 10:17)  HR: 85 (11-13-18 @ 10:17) (79 - 85)  BP: 121/66 (11-13-18 @ 10:17) (120/61 - 121/66)  RR: 18 (11-13-18 @ 10:17) (18 - 18)  SpO2: 95% (11-13-18 @ 10:17) (95% - 98%)  Wt(kg): --  HEENT: Moist mucous membranes  NECK: Supple, No JVD  CHEST/LUNG: Clear to auscultation bilaterally; No rales, no rhonchi, no wheezing, or rubs  HEART: Regular rate and rhythm; No murmurs, rubs, or gallops  ABDOMEN: Soft, Nontender, Nondistended; Bowel sounds present  GENITOURINARY- Voiding, no suprapubic tenderness  EXTREMITIES:  2+ Peripheral Pulses, No clubbing, cyanosis, or edema  · Assessment		  Fall w/ injury likely syncope vs ACS vs mechanical fall  Elevated troponins due to NSTEMI   - r/o CVA - CTH negative  - troponins elevated   - ~deep TWI on initial ECG in v1-v3; decrease in depth noted with f/u am ECG  - chest pain free  - asa//BB/ statin ordered  - plavix stopped due to ongoing hematuria  - 2 d echo - severe AS , EF 35%  - tele - some PVC   - cardiac consult  - not candidate for interventions  - pain mgmt with tylenol  - s/p 9 sutures in ED - surgery consult for sutures removal prior rjai  - routine wound care to superficial BLE abrasions  * UTI due to E coli  - start ceftriaxone --> ceftin 500 bid for 4 more days   - urine cx noted   * AS , - 2 d echo   - cardiology consult - not an candidate for interventions  Essential HTN  - BP stable (101-120's systolic)  - VS per routine  - home med norvasc on hold given initiation of BB /metoprolol  - con't lisinopril 2.5mg daily  Hematuria , resolved  Urinary retention with moderate bilateral hydronephrosis s/p suprapubic catheter   - stop heparin sq, monitor  - stop plavix , will restart asa 81 - 11/11 - tolerating   - c/w finasteride, add flomax  - us kidney - noted  - urology consult - unable to insert Verduzco, will need IR placement of suprapubic catheter  - d/w Dr. Bangura cardiologist - ok to stop plavix, patient high risk but there is no absolute contraindication for emergent procedure  - Revised cardiac risk - moderate 6.6 % of MI, cardiac arrest   Dementia / Alzheimers / Anxiety / Insomnia  - assist with ADL's as needed  - con't home meds: aricept,/ buspar / rapaflo / trazodone / melatonin  BPH  - con't proscar  - monitor I & O  Agitation suspected underlying dementia with fluctuating mental status  - supportive care  - seroquel prn   - OOB in the chair daily  - address pain with tylenol before giving the seroquel  Advanced directives   - 15 minutes spend   - goals of care d/w pt & HCP--> DNR  HCP: KIRIT Leal [phone], [phone]	  PCP Mynor Nath at [facility] [phone]	  sydneytana Graciela Poole [phone]  plan for [facility] as per HCP wishes   prior authorization pending   Disposition - medically optimized to be discharged to [facility]  with close follow up with PCP, cardiology and urology within 1 week  complete antibiotics  return to ED if fever, abdominal pain, nausea, vomiting, chest pain, dyspnea  Discharge plan discussed with patient, RN  Patient advised to follow up with PCP within 3-7 days  time spend 40 min  Discharge note faxed to PCP with my contact information to call me back Subjective:  Patient is a 89y old  Male who presents with a chief complaint of fall, lacerations      HPI:  89 y/o male with a PMHx of HTN, BPH presents to the ED from [facility] s/p unwitnessed fall. Not on anticoagulants. Pt does not know why he is at the hospital. +laceration on the left 4 th finger   In the ED CTH / CT spine negative for acute findings; degenerative changes of spine;  he received 9 sutures to LUE 4th digit; troponin 0.391-->2.810-->2.950; ECG with TWI V1-v3; CXR w/ subtle airspace opacity at the right lower lobe cannot be excluded. No definite pleural effusions. Minimal fibrotic change at the left costophrenic angle is unchanged. Cardiomediastinal silhouette is intact.  Subjective: Denies CP/palpitation/SOB/HA/dizziness/abd pain/n/v/d/f/c; ECG NSR 70 with less pronounced TWI in v1-v4 than initial ECG yesterday on arrival to ED     11/8/18 - Patient seen and examined at bedside earlier today, denies cp, dyspnea, abdominal pain, afebrile   11/9/18 - pt seen and examined , denies CP, palpitations, abdominal pain, + hematuria, POC discussed at bedside  11/10 - pt seen and examined, gross hematuria from suprabupic cathether , denies pain, palpitations, tele - sinus   11/11 - pt seen and examined, denies abdominal pain, suprapubic cath drains clear urine, afebrile, eager to go to [facility]   11/12 - pt seen and examined, denies pain, catheter draining clear urine , afebrile  11/13 - pt seen and examined , agitation overnight, awaiting rhehab placement  Review of system- Rest of the review of system are negative except mentioned in HPI    · Assessment		  Fall w/ injury likely syncope vs ACS vs mechanical fall  Elevated troponins due to NSTEMI   - r/o CVA - CTH negative  - troponins elevated   - ~deep TWI on initial ECG in v1-v3; decrease in depth noted with f/u am ECG  - chest pain free  - asa//BB/ statin ordered  - plavix stopped due to ongoing hematuria  - 2 d echo - severe AS , EF 35%  - tele - some PVC   - cardiac consult  - not candidate for interventions  - pain mgmt with tylenol  - s/p 9 sutures in ED - surgery consult for sutures removal prior [facility]  - routine wound care to superficial BLE abrasions  * UTI due to E coli  - start ceftriaxone --> ceftin 500 bid for 4 more days   - urine cx noted   * AS , - 2 d echo   - cardiology consult - not an candidate for interventions  Essential HTN  - BP stable (101-120's systolic)  - VS per routine  - home med norvasc on hold given initiation of BB /metoprolol  - con't lisinopril 2.5mg daily  Hematuria , resolved  Urinary retention with moderate bilateral hydronephrosis s/p suprapubic catheter   - stop heparin sq, monitor  - stop plavix , will restart asa 81 - 11/11 - tolerating   - c/w finasteride, add flomax  - us kidney - noted  - urology consult - unable to insert Verduzco, will need IR placement of suprapubic catheter  - d/w Dr. Bangura cardiologist - ok to stop plavix, patient high risk but there is no absolute contraindication for emergent procedure  - Revised cardiac risk - moderate 6.6 % of MI, cardiac arrest   Dementia / Alzheimers / Anxiety / Insomnia  - assist with ADL's as needed  - con't home meds: aricept,/ buspar / rapaflo / trazodone / melatonin  BPH  - con't proscar  - monitor I & O  Agitation suspected underlying dementia with fluctuating mental status  - supportive care  - seroquel prn   - OOB in the chair daily  - address pain with tylenol before giving the seroquel  Advanced directives   - 15 minutes spend   - goals of care d/w pt & HCP--> DNR  HCP: ANN Leal [phone], [phone]	  PCP Mynor Nath at [facility] [phone]	  ann Poole [phone]  plan for [facility] as per HCP wishes   prior authorization pending   Disposition - medically optimized to be discharged to [facility]  with close follow up with PCP, cardiology and urology within 1 week  complete antibiotics  return to ED if fever, abdominal pain, nausea, vomiting, chest pain, dyspnea  Discharge plan discussed with patient, RN  Patient advised to follow up with PCP within 3-7 days  time spend 40 min  Discharge note faxed to PCP with my contact information to call me back

## 2018-11-14 LAB
ANION GAP SERPL CALC-SCNC: 6 MMOL/L — SIGNIFICANT CHANGE UP (ref 5–17)
BUN SERPL-MCNC: 21 MG/DL — SIGNIFICANT CHANGE UP (ref 7–23)
CALCIUM SERPL-MCNC: 8.1 MG/DL — LOW (ref 8.5–10.1)
CHLORIDE SERPL-SCNC: 108 MMOL/L — SIGNIFICANT CHANGE UP (ref 96–108)
CO2 SERPL-SCNC: 27 MMOL/L — SIGNIFICANT CHANGE UP (ref 22–31)
CREAT SERPL-MCNC: 0.87 MG/DL — SIGNIFICANT CHANGE UP (ref 0.5–1.3)
GLUCOSE SERPL-MCNC: 92 MG/DL — SIGNIFICANT CHANGE UP (ref 70–99)
HCT VFR BLD CALC: 35.5 % — LOW (ref 39–50)
HGB BLD-MCNC: 11.6 G/DL — LOW (ref 13–17)
MCHC RBC-ENTMCNC: 29.7 PG — SIGNIFICANT CHANGE UP (ref 27–34)
MCHC RBC-ENTMCNC: 32.7 GM/DL — SIGNIFICANT CHANGE UP (ref 32–36)
MCV RBC AUTO: 90.8 FL — SIGNIFICANT CHANGE UP (ref 80–100)
NRBC # BLD: 0 /100 WBCS — SIGNIFICANT CHANGE UP (ref 0–0)
PLATELET # BLD AUTO: 266 K/UL — SIGNIFICANT CHANGE UP (ref 150–400)
POTASSIUM SERPL-MCNC: 4 MMOL/L — SIGNIFICANT CHANGE UP (ref 3.5–5.3)
POTASSIUM SERPL-SCNC: 4 MMOL/L — SIGNIFICANT CHANGE UP (ref 3.5–5.3)
RBC # BLD: 3.91 M/UL — LOW (ref 4.2–5.8)
RBC # FLD: 14.3 % — SIGNIFICANT CHANGE UP (ref 10.3–14.5)
SODIUM SERPL-SCNC: 141 MMOL/L — SIGNIFICANT CHANGE UP (ref 135–145)
WBC # BLD: 7.73 K/UL — SIGNIFICANT CHANGE UP (ref 3.8–10.5)
WBC # FLD AUTO: 7.73 K/UL — SIGNIFICANT CHANGE UP (ref 3.8–10.5)

## 2018-11-14 RX ADMIN — ATORVASTATIN CALCIUM 40 MILLIGRAM(S): 80 TABLET, FILM COATED ORAL at 21:08

## 2018-11-14 RX ADMIN — Medication 10 MILLIGRAM(S): at 05:23

## 2018-11-14 RX ADMIN — Medication 25 MILLIGRAM(S): at 05:22

## 2018-11-14 RX ADMIN — Medication 5 MILLIGRAM(S): at 21:08

## 2018-11-14 RX ADMIN — TAMSULOSIN HYDROCHLORIDE 0.4 MILLIGRAM(S): 0.4 CAPSULE ORAL at 21:08

## 2018-11-14 RX ADMIN — Medication 81 MILLIGRAM(S): at 11:37

## 2018-11-14 RX ADMIN — DONEPEZIL HYDROCHLORIDE 5 MILLIGRAM(S): 10 TABLET, FILM COATED ORAL at 21:08

## 2018-11-14 RX ADMIN — Medication 10 MILLIGRAM(S): at 17:40

## 2018-11-14 RX ADMIN — Medication 500 MILLIGRAM(S): at 17:39

## 2018-11-14 RX ADMIN — LISINOPRIL 2.5 MILLIGRAM(S): 2.5 TABLET ORAL at 21:08

## 2018-11-14 RX ADMIN — Medication 50 MILLIGRAM(S): at 21:08

## 2018-11-14 RX ADMIN — FINASTERIDE 5 MILLIGRAM(S): 5 TABLET, FILM COATED ORAL at 11:37

## 2018-11-14 RX ADMIN — QUETIAPINE FUMARATE 12.5 MILLIGRAM(S): 200 TABLET, FILM COATED ORAL at 05:21

## 2018-11-14 RX ADMIN — Medication 500 MILLIGRAM(S): at 05:23

## 2018-11-14 NOTE — PROGRESS NOTE ADULT - NSHPATTENDINGPLANDISCUSS_GEN_ALL_CORE
pt, rn,  daughter
pt, rn, left the message to daughter
agustin ,

## 2018-11-14 NOTE — PROGRESS NOTE ADULT - SUBJECTIVE AND OBJECTIVE BOX
Subjective:  Patient is a 89y old  Male who presents with a chief complaint of fall, lacerations      HPI:  89 y/o male with a PMHx of HTN, BPH presents to the ED from [facility] s/p unwitnessed fall. Not on anticoagulants. Pt does not know why he is at the hospital. +laceration on the left 4 th finger   In the ED CTH / CT spine negative for acute findings; degenerative changes of spine;  he received 9 sutures to LUE 4th digit; troponin 0.391-->2.810-->2.950; ECG with TWI V1-v3; CXR w/ subtle airspace opacity at the right lower lobe cannot be excluded. No definite pleural effusions. Minimal fibrotic change at the left costophrenic angle is unchanged. Cardiomediastinal silhouette is intact.  Subjective: Denies CP/palpitation/SOB/HA/dizziness/abd pain/n/v/d/f/c; ECG NSR 70 with less pronounced TWI in v1-v4 than initial ECG yesterday on arrival to ED     11/8/18 - Patient seen and examined at bedside earlier today, denies cp, dyspnea, abdominal pain, afebrile   11/9/18 - pt seen and examined , denies CP, palpitations, abdominal pain, + hematuria, POC discussed at bedside  11/10 - pt seen and examined, gross hematuria from suprabupic cathether , denies pain, palpitations, tele - sinus   11/11 - pt seen and examined, denies abdominal pain, suprapubic cath drains clear urine, afebrile, eager to go to [facility]   11/12 - pt seen and examined, denies pain, catheter draining clear urine , afebrile  11/13 - pt seen and examined , agitation overnight, awaiting rhehab placement  11/14:  Patient seen and examined.  Lethargic.  Received seroquel overnight for agitation.      Review of system- Rest of the review of system are negative except mentioned in HPI    MEDICATIONS  (STANDING):  aspirin  chewable 81 milliGRAM(s) Oral daily  atorvastatin 40 milliGRAM(s) Oral at bedtime  busPIRone 10 milliGRAM(s) Oral two times a day  cefuroxime   Tablet 500 milliGRAM(s) Oral every 12 hours  donepezil 5 milliGRAM(s) Oral at bedtime  finasteride 5 milliGRAM(s) Oral daily  lisinopril 2.5 milliGRAM(s) Oral at bedtime  melatonin 5 milliGRAM(s) Oral at bedtime  metoprolol succinate ER 25 milliGRAM(s) Oral daily  sodium chloride 0.9%. 1000 milliLiter(s) (50 mL/Hr) IV Continuous <Continuous>  tamsulosin 0.4 milliGRAM(s) Oral at bedtime  traZODone 50 milliGRAM(s) Oral at bedtime    MEDICATIONS  (PRN):  acetaminophen   Tablet .. 650 milliGRAM(s) Oral every 6 hours PRN Temp greater or equal to 38C (100.4F), Mild Pain (1 - 3)  naphazoline/pheniramine Solution 1 Drop(s) Both EYES four times a day PRN Dry and itchy eyes  nitroglycerin     SubLingual 0.4 milliGRAM(s) SubLingual every 5 minutes PRN Chest Pain  QUEtiapine 12.5 milliGRAM(s) Oral every 8 hours PRN for anxiety      Vital Signs Last 24 Hrs  T(C): 36.6 (14 Nov 2018 10:21), Max: 36.7 (14 Nov 2018 04:50)  T(F): 97.9 (14 Nov 2018 10:21), Max: 98 (14 Nov 2018 04:50)  HR: 77 (14 Nov 2018 10:21) (62 - 77)  BP: 135/83 (14 Nov 2018 10:21) (97/55 - 135/83)  BP(mean): --  RR: 18 (14 Nov 2018 10:21) (17 - 18)  SpO2: 97% (14 Nov 2018 10:21) (96% - 97%)    PHYSICAL EXAM:  GENERAL: NAD  NERVOUS SYSTEM: lethargic  HEAD:  Atraumatic, Normocephalic  EYES: EOMI, PERRLA, conjunctiva and sclera clear  HEENT: Moist mucous membranes  NECK: Supple, No JVD  CHEST/LUNG: Clear to auscultation bilaterally; No rales, no rhonchi, no wheezing, or rubs  HEART: III/VI EVAN.  RRR>    ABDOMEN: Soft, Nontender, Nondistended; Bowel sounds present  GENITOURINARY-suprapubic catheter present with yellow urine.    EXTREMITIES:  2+ Peripheral Pulses, No clubbing, cyanosis, or edema  MUSCULOSKELETAL:- No muscle tenderness, Muscle tone normal, No joint tenderness, no Joint swelling, Joint range of motion-normal  SKIN-no rash, no lesion    11-14    141  |  108  |  21  ----------------------------<  92  4.0   |  27  |  0.87    Ca    8.1<L>      14 Nov 2018 06:35                              11.6   7.73  )-----------( 266      ( 14 Nov 2018 06:35 )             35.5       RADIOLOGY & ADDITIONAL TESTS:    < from: US Kidney and Bladder (11.09.18 @ 09:41) >  IMPRESSION:     Moderate bilateral hydroureteronephrosis and markedly distended urinary   bladder.      < end of copied text >    < from: CT Cervical Spine No Cont (11.06.18 @ 22:49) >  Head:  Parenchymal volume loss is noted with prominent ventricles andsulci.   Chronic microvascular ischemic changes are identified. No acute   territorial infarct is demonstrated.    There is no evidence of an acute hemorrhage or mass-effect in the   posterior fossa or in the supratentorial region.     Evaluation of the osseous structures with the appropriate window appears   unremarkable. Vascular calcifications are noted. Sequela of right lens   surgery is seen.    Cervical spine:  Bones: Straightening of the normal cervical lordosis is seen which is   likely due to muscle spasm versus patient positioning. Grade 1   anterolisthesis of C7 on T1 and T1 on T2 are noted. The cervical   vertebral body heights are maintained. No fracture is demonstrated. Disc   space narrowing and marginal osteophyte formation isseen throughout the   cervical and upper thoracic spine. Multilevel posterior disc osteophyte   complexes are seen without evidence of severe spinal canal stenosis.   Multilevel neural foraminal stenosis is noted.    Soft tissues: The prevertebral soft tissues are unremarkable. The thyroid   is unremarkable.    Lung apices: A right pleural effusion is noted.       IMPRESSION:   1. No acute territorial infarct, hemorrhage, mass effect or calvarial   fracture.  2. Multilevel degenerative changes ofthe cervical spine without evidence   of a fracture.      < end of copied text >    < from: Xray Chest 1 View- PORTABLE-Urgent (11.06.18 @ 23:01) >    Subtle airspace opacity at the right lower lobe cannot be excluded. No   definite pleural effusions. Minimal fibrotic change at the left   costophrenic angle is unchanged  .      Cardiomediastinal silhouette is intact.

## 2018-11-14 NOTE — PROGRESS NOTE ADULT - REASON FOR ADMISSION
fall, lacerations
fall, lacerations  MI

## 2018-11-14 NOTE — CONSULT NOTE ADULT - SUBJECTIVE AND OBJECTIVE BOX
89 yo M S/P fall on 11/6 causing a laceration to the left 4th digit which was repaired by ED attending.    Vital Signs Last 24 Hrs  T(C): 36.7 (14 Nov 2018 04:50), Max: 36.8 (13 Nov 2018 10:17)  T(F): 98 (14 Nov 2018 04:50), Max: 98.2 (13 Nov 2018 10:17)  HR: 66 (14 Nov 2018 04:50) (66 - 85)  BP: 106/52 (14 Nov 2018 04:50) (106/52 - 121/66)  BP(mean): --  RR: 18 (14 Nov 2018 04:50) (18 - 18)  SpO2: 96% (14 Nov 2018 04:50) (95% - 96%)

## 2018-11-14 NOTE — PROGRESS NOTE ADULT - PROVIDER SPECIALTY LIST ADULT
Cardiology
Hospitalist
Palliative Care
Urology
Urology
Hospitalist

## 2018-11-14 NOTE — PROGRESS NOTE ADULT - ASSESSMENT
Assessment:  	 	  89 y/o male with a PMHx of HTN, BPH presents to the ED from [facility] s/p unwitnessed fall. Not on anticoagulants. Pt does not know why he is at the hospital. +laceration on the left 4 th finger   In the ED CTH / CT spine negative for acute findings; degenerative changes of spine;  he received 9 sutures to LUE 4th digit; troponin 0.391-->2.810-->2.950; ECG with TWI V1-v3; CXR w/ subtle airspace opacity at the right lower lobe cannot be excluded. No definite pleural effusions.     1.  NSTEMI:    Cont ASA/ statin.    Hematuria from plavix.    Medical management.    ECHO with EF of 35% and severe AS.    D/c TELE.      2.  Fall with injury:    CT head negative for CVA.    S/p sutures in ER for hand laceration.    Surgical eval for removal.      3.  UTI:    Complete ceftin.,  Total of 7 days.      4.  Urinary Retention/ Hydronephrosis:    S/p suprapubic cath placement.    - urology consult - unable to insert Verduzco, will need IR placement of suprapubic catheter    5.  HTN:    BP stable (101-120's systolic)  - VS per routine  - home med norvasc on hold given initiation of BB /metoprolol  - con't lisinopril 2.5mg daily    6.  Dementia/ Agitation:    Cont home meds.  Trazodone/ Seroquel PRN.      7.  DISPO:    Goals of care d/w pt & HCP--> DNR  HCP: ANN Leal [phone], [phone]	  PCP Mynor Nath at [facility] [phone]	  ann Poole [phone]  - SW/PT/case management consult for post discharge needs  11/12 - update ann Owens,   plan for [facility] as per HCP wishes   prior authorization pending

## 2018-11-15 VITALS
HEART RATE: 74 BPM | SYSTOLIC BLOOD PRESSURE: 97 MMHG | OXYGEN SATURATION: 97 % | DIASTOLIC BLOOD PRESSURE: 49 MMHG | RESPIRATION RATE: 17 BRPM | TEMPERATURE: 98 F

## 2018-11-15 RX ADMIN — Medication 25 MILLIGRAM(S): at 05:17

## 2018-11-15 RX ADMIN — Medication 500 MILLIGRAM(S): at 05:17

## 2018-11-15 RX ADMIN — Medication 10 MILLIGRAM(S): at 05:17
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
## 2018-11-23 DIAGNOSIS — S80.811A ABRASION, RIGHT LOWER LEG, INITIAL ENCOUNTER: ICD-10-CM

## 2018-11-23 DIAGNOSIS — S80.812A ABRASION, LEFT LOWER LEG, INITIAL ENCOUNTER: ICD-10-CM

## 2018-11-23 DIAGNOSIS — G47.00 INSOMNIA, UNSPECIFIED: ICD-10-CM

## 2018-11-23 DIAGNOSIS — B96.20 UNSPECIFIED ESCHERICHIA COLI [E. COLI] AS THE CAUSE OF DISEASES CLASSIFIED ELSEWHERE: ICD-10-CM

## 2018-11-23 DIAGNOSIS — I42.9 CARDIOMYOPATHY, UNSPECIFIED: ICD-10-CM

## 2018-11-23 DIAGNOSIS — I21.4 NON-ST ELEVATION (NSTEMI) MYOCARDIAL INFARCTION: ICD-10-CM
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
## 2018-11-23 DIAGNOSIS — R55 SYNCOPE AND COLLAPSE: ICD-10-CM

## 2018-11-23 DIAGNOSIS — F02.80 DEMENTIA IN OTHER DISEASES CLASSIFIED ELSEWHERE, UNSPECIFIED SEVERITY, WITHOUT BEHAVIORAL DISTURBANCE, PSYCHOTIC DISTURBANCE, MOOD DISTURBANCE, AND ANXIETY: ICD-10-CM
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
## 2018-11-23 DIAGNOSIS — S61.412A LACERATION WITHOUT FOREIGN BODY OF LEFT HAND, INITIAL ENCOUNTER: ICD-10-CM

## 2018-11-23 DIAGNOSIS — I35.0 NONRHEUMATIC AORTIC (VALVE) STENOSIS: ICD-10-CM

## 2018-11-23 DIAGNOSIS — G30.9 ALZHEIMER'S DISEASE, UNSPECIFIED: ICD-10-CM

## 2018-11-23 DIAGNOSIS — F41.9 ANXIETY DISORDER, UNSPECIFIED: ICD-10-CM

## 2018-11-23 DIAGNOSIS — N13.6 PYONEPHROSIS: ICD-10-CM

## 2018-11-23 DIAGNOSIS — I10 ESSENTIAL (PRIMARY) HYPERTENSION: ICD-10-CM
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
## 2018-11-23 DIAGNOSIS — R33.9 RETENTION OF URINE, UNSPECIFIED: ICD-10-CM

## 2018-11-23 DIAGNOSIS — N32.0 BLADDER-NECK OBSTRUCTION: ICD-10-CM

## 2018-11-23 DIAGNOSIS — R32 UNSPECIFIED URINARY INCONTINENCE: ICD-10-CM

## 2018-11-23 DIAGNOSIS — N40.0 BENIGN PROSTATIC HYPERPLASIA WITHOUT LOWER URINARY TRACT SYMPTOMS: ICD-10-CM

## 2021-07-20 NOTE — PHYSICAL THERAPY INITIAL EVALUATION ADULT - MODALITIES TREATMENT COMMENTS
69
pt left in bed supine post Eval; bed alarm on; HM in place; RN manager Jalil / ANDREW Kent present; callbell in reach; andres well; denied pain

## 2023-05-24 NOTE — CONSULT NOTE ADULT - ASSESSMENT
REBECCA Pedraza in room @ [id number] A/P  An attempt was made to remove sutures however pt was confused and agitated so encounter was aborted.  Will reattempt again.

## 2023-10-06 NOTE — DISCHARGE NOTE ADULT - PLAN OF CARE
200 mcg nitroglycerin given IC for coronary vasodilation by Dr. Mendes  prevent recurrence PT, supportive care, fall precautions, minimize polypharmacy  finger laceration s/p repair in ed continue with ASA 81, statins, acei, not on plavix due ot hematuria conservative management continue current medications s/p suprapubic catheter , follow up with urology hector 1-2 weeks supportive care, use low dose seroquel prn for agitations complete 4 more days of ceftin
